# Patient Record
Sex: FEMALE | Race: WHITE | NOT HISPANIC OR LATINO | Employment: FULL TIME | URBAN - METROPOLITAN AREA
[De-identification: names, ages, dates, MRNs, and addresses within clinical notes are randomized per-mention and may not be internally consistent; named-entity substitution may affect disease eponyms.]

---

## 2017-01-06 ENCOUNTER — TELEPHONE (OUTPATIENT)
Dept: SURGERY | Facility: CLINIC | Age: 72
End: 2017-01-06

## 2017-01-09 ENCOUNTER — TELEPHONE (OUTPATIENT)
Dept: SURGERY | Facility: CLINIC | Age: 72
End: 2017-01-09

## 2017-01-09 NOTE — TELEPHONE ENCOUNTER
Patient called to ask how she is to care for an open wound until she see's the MD on Friday. Instructed her to keep it clean and dry. The MD will give further care instructions upon assessment. Pt verbalized understanding.

## 2017-01-13 ENCOUNTER — OFFICE VISIT (OUTPATIENT)
Dept: PLASTIC SURGERY | Facility: CLINIC | Age: 72
End: 2017-01-13
Payer: MEDICARE

## 2017-01-13 DIAGNOSIS — T85.43XA BREAST IMPLANT RUPTURE, INITIAL ENCOUNTER: Primary | ICD-10-CM

## 2017-01-13 PROCEDURE — 1159F MED LIST DOCD IN RCRD: CPT | Mod: S$GLB,,, | Performed by: SURGERY

## 2017-01-13 PROCEDURE — 1157F ADVNC CARE PLAN IN RCRD: CPT | Mod: S$GLB,,, | Performed by: SURGERY

## 2017-01-13 PROCEDURE — 99204 OFFICE O/P NEW MOD 45 MIN: CPT | Mod: S$GLB,,, | Performed by: SURGERY

## 2017-01-13 PROCEDURE — 1160F RVW MEDS BY RX/DR IN RCRD: CPT | Mod: S$GLB,,, | Performed by: SURGERY

## 2017-01-13 NOTE — PROGRESS NOTES
Lilli Stringer presents after having implants placed 25 years ago, she had   silicone implants placed.  She thought she had a skin lesion.  She was seen by a   dermatologist.  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  She has got ruptured   silicone implants.  The silicone is on the right medial aspect of the breast   leaking through the skin.  It is inflamed.  It is not infected.  She needs to   have a bilateral implant removal with total capsulectomy and drainage of   extravasated silicone.  She was interested in further surgery, possibly a breast   lift and implants were placed.  I told her that this is a surgery needs to be   performed first in by itself that would not perform any other procedures at the   same time due to the fact that she is a smoker if she wanted any further lift in   an implant, she would have to do that in two stages.  We will schedule her for   her surgery.      CHRISTOPHER  dd: 01/13/2017 12:55:06 (CST)  td: 01/14/2017 11:58:10 (CST)  Doc ID   #[id number]  Job ID #[id number]    CC:

## 2017-01-13 NOTE — LETTER
January 13, 2017      Drake Mcclure MD  [address] [facility] [address]  [address]  [place] [address]           [facility]  [address] [facility] [address] [place] [address]  Phone: [phone]  Fax: [phone]          Patient: Lilli Stringer   MR Number: [id number]   YOB: 1945   Date of Visit: 1/13/2017       Dear Dr. Drake Mcclure:    Thank you for referring Lilli Stringer to me for evaluation. Attached you will find relevant portions of my assessment and plan of care.    If you have questions, please do not hesitate to call me. I look forward to following Lilli Stringer along with you.    Sincerely,    Kunal Owen MD    Enclosure  CC:  No Recipients    If you would like to receive this communication electronically, please contact [email or web address] or [phone] to request more information on [employer] Link access.    For providers and/or their staff who would like to refer a patient to [facility], please contact us through our one-stop-shop provider referral line, [facility], at [phone].    If you feel you have received this communication in error or would no longer like to receive these types of communications, please e-mail [email or web address]

## 2017-02-24 ENCOUNTER — OFFICE VISIT (OUTPATIENT)
Dept: PLASTIC SURGERY | Facility: CLINIC | Age: 72
End: 2017-02-24
Payer: MEDICARE

## 2017-02-24 DIAGNOSIS — T85.44XD CAPSULAR CONTRACTURE OF BREAST IMPLANT, SUBSEQUENT ENCOUNTER: Primary | ICD-10-CM

## 2017-02-24 PROCEDURE — 99212 OFFICE O/P EST SF 10 MIN: CPT | Mod: S$GLB,,, | Performed by: SURGERY

## 2017-02-24 PROCEDURE — 1157F ADVNC CARE PLAN IN RCRD: CPT | Mod: S$GLB,,, | Performed by: SURGERY

## 2017-02-24 PROCEDURE — 1160F RVW MEDS BY RX/DR IN RCRD: CPT | Mod: S$GLB,,, | Performed by: SURGERY

## 2017-02-24 PROCEDURE — 1159F MED LIST DOCD IN RCRD: CPT | Mod: S$GLB,,, | Performed by: SURGERY

## 2017-02-24 NOTE — PROGRESS NOTES
Ms. Stringer initially presented to the [facility] with an acute   problem on January 13th.  She had bilateral implants placed almost 25 to 30   years ago.  At that time, my evaluation showed that her breast on the right side   was inflamed, that she had a free silicone leaking out of the wound in the   medial aspect of the breast.  On the right side, she has a severe capsular   contracture.  Implants placed 25 to 30 years almost assuredly ruptured at this   time due to the poor manufacturing at that time.  I presented this case to the   insurance company.  She has an absolute without question medical indication to   have her implants and capsules removed.  She has silicone draining from her   breast out onto her skin.  She has a large inflammatory reaction because of   this.  Apparently, the insurance company did not believe that this was a   medically indicated procedure.  This is without question a medically indicated   procedure.  She needs to have her bilateral implants removed, bilateral   capsulectomies and she also needs to have the free silicone which is   extravasated in the right medial breast area also excised.  We will go ahead and   submit this again to the insurance company.      KRYSTYNA  dd: 02/24/2017 10:31:29 (CST)  td: 02/24/2017 20:25:14 (CST)  Doc ID   #[id number]  Job ID #[id number]    CC:

## 2017-06-02 ENCOUNTER — TELEPHONE (OUTPATIENT)
Dept: FAMILY MEDICINE | Facility: CLINIC | Age: 72
End: 2017-06-02

## 2017-06-02 ENCOUNTER — OFFICE VISIT (OUTPATIENT)
Dept: PLASTIC SURGERY | Facility: CLINIC | Age: 72
End: 2017-06-02
Payer: MEDICARE

## 2017-06-02 VITALS — WEIGHT: 114 LBS | BODY MASS INDEX: 18.97 KG/M2

## 2017-06-02 DIAGNOSIS — T85.44XD CAPSULAR CONTRACTURE OF BREAST IMPLANT, SUBSEQUENT ENCOUNTER: Primary | ICD-10-CM

## 2017-06-02 PROCEDURE — 1159F MED LIST DOCD IN RCRD: CPT | Mod: S$GLB,,, | Performed by: SURGERY

## 2017-06-02 PROCEDURE — 99212 OFFICE O/P EST SF 10 MIN: CPT | Mod: S$GLB,,, | Performed by: SURGERY

## 2017-06-02 PROCEDURE — 99999 PR PBB SHADOW E&M-EST. PATIENT-LVL I: CPT | Mod: PBBFAC,,, | Performed by: SURGERY

## 2017-06-02 NOTE — TELEPHONE ENCOUNTER
----- Message from Gui Jackson sent at 6/2/2017 11:02 AM CDT -----  Pt called asking for a call back to schedule a surgery clearance appt that is in a couple of weeks/pls call back at [phone] or [phone]

## 2017-06-02 NOTE — TELEPHONE ENCOUNTER
Preop clearance schedule on 6/12/17.  Surgery with Dr Owen, date not confirmed yet- approx 2 weeks.

## 2017-06-02 NOTE — PROGRESS NOTES
Ms. Stringer presents again.  She has got a ruptured silicone implant on the   right hand side with freely leaking silicone.  We discussed this in January.    She needs to have the implant removed.  The insurance company refused to pay for   the opposite side.  She would like both of them out.  I stated to her and I   stated again today, I will not do any form of reconstruction on her breast after   removing the implant for about six months.  She is a heavy smoker.  The risks   are high.  She just needs to have a bilateral implant removal with bilateral   total capsulectomy.      CRB/PN  dd: 06/02/2017 10:20:23 (CDT)  td: 06/02/2017 15:18:27 (CDT)  Doc ID   #[id number]  Job ID #[id number]    CC:

## 2017-06-05 ENCOUNTER — TELEPHONE (OUTPATIENT)
Dept: FAMILY MEDICINE | Facility: CLINIC | Age: 72
End: 2017-06-05

## 2017-06-05 NOTE — TELEPHONE ENCOUNTER
patient having implant replacement because of leakage. Her appt for clearance was scheduled on 6/12. She said surgery sometime in July. She wanted to know if somebody can see her for this,She is having general anesthesia.Patient put in waiting list until Monday. Please advise,Thanks

## 2017-06-12 ENCOUNTER — PATIENT OUTREACH (OUTPATIENT)
Dept: ADMINISTRATIVE | Facility: HOSPITAL | Age: 72
End: 2017-06-12

## 2017-06-20 ENCOUNTER — PATIENT OUTREACH (OUTPATIENT)
Dept: ADMINISTRATIVE | Facility: HOSPITAL | Age: 72
End: 2017-06-20

## 2017-06-20 NOTE — LETTER
June 20, 2017    Lilli Stringer  [address] [place] [address]             [facility]  [address]  [facility] [address]  Phone: [phone] Dear Ms. Stringer:    We have tried to reach you by mychart unsuccessfully.    [facility] is committed to your overall health.  To help you get the most out of each of your visits, we will review your information to make sure you are up to date on all of your recommended tests and/or procedures.       Dr. Gonzáles         has found that you may be due for:     One-time Hepatitis C Screening lab test(a viral condition that can harm the liver)   Cholesterol check (Lipid Panel)   Tetanus immunization   Osteoporosis screening (DEXA SCAN)   colonoscopy   Shingles immunization   Pneumonia immunization     If you have had any of the above done at another facility, please bring the records or information with you so that your record at [facility] will be complete.      If you are currently taking medication , please bring it with you to your appointment for review.     We appreciate the opportunity to provide you with excellent medical care.     Sincerely,    May Garcia  Clinical Care Coordinator  [facility] [address] [facility] [address]  [place] [address]  Phone: [phone]   Fax: [phone]

## 2017-06-26 ENCOUNTER — OFFICE VISIT (OUTPATIENT)
Dept: FAMILY MEDICINE | Facility: CLINIC | Age: 72
End: 2017-06-26
Payer: MEDICARE

## 2017-06-26 VITALS
DIASTOLIC BLOOD PRESSURE: 70 MMHG | TEMPERATURE: 98 F | SYSTOLIC BLOOD PRESSURE: 126 MMHG | HEART RATE: 76 BPM | WEIGHT: 114 LBS | BODY MASS INDEX: 18.99 KG/M2 | HEIGHT: 65 IN | RESPIRATION RATE: 18 BRPM

## 2017-06-26 DIAGNOSIS — Z01.818 PREOP EXAMINATION: Primary | ICD-10-CM

## 2017-06-26 DIAGNOSIS — Z00.00 PREVENTATIVE HEALTH CARE: ICD-10-CM

## 2017-06-26 DIAGNOSIS — T85.43XD BREAST IMPLANT RUPTURE, SUBSEQUENT ENCOUNTER: ICD-10-CM

## 2017-06-26 PROCEDURE — 1126F AMNT PAIN NOTED NONE PRSNT: CPT | Mod: S$GLB,,, | Performed by: FAMILY MEDICINE

## 2017-06-26 PROCEDURE — 99999 PR PBB SHADOW E&M-EST. PATIENT-LVL III: CPT | Mod: PBBFAC,,, | Performed by: FAMILY MEDICINE

## 2017-06-26 PROCEDURE — 1159F MED LIST DOCD IN RCRD: CPT | Mod: S$GLB,,, | Performed by: FAMILY MEDICINE

## 2017-06-26 PROCEDURE — 99397 PER PM REEVAL EST PAT 65+ YR: CPT | Mod: S$GLB,,, | Performed by: FAMILY MEDICINE

## 2017-06-26 NOTE — PROGRESS NOTES
Subjective:       Patient ID: Lilli Stringer is a 71 y.o. female.    Chief Complaint: Pre-op Exam (Breast implant removals, Dr. Owen)    She will be having bilateral implants removed with Dr. Owen.    She is overall feeling well.        Past Medical History:   Diagnosis Date    Allergy     Endometriosis of uterus        Past Surgical History:   Procedure Laterality Date    BREAST SURGERY  1980s    DILATION AND CURETTAGE OF UTERUS      TUBAL LIGATION         Review of patient's allergies indicates:   Allergen Reactions    Iodine and iodide containing products     Sulfa (sulfonamide antibiotics)        Social History     Social History    Marital status:      Spouse name: N/A    Number of children: 1    Years of education: N/A     Occupational History    [employer]      Social History Main Topics    Smoking status: Current Some Day Smoker     Packs/day: 0.25     Types: Cigarettes    Smokeless tobacco: Never Used    Alcohol use Yes      Comment: social    Drug use: No    Sexual activity: Yes     Partners: Male     Birth control/ protection: None, Surgical     Other Topics Concern    Not on file     Social History Narrative    No narrative on file       Current Outpatient Prescriptions on File Prior to Visit   Medication Sig Dispense Refill    fluticasone (FLONASE) 50 mcg/actuation nasal spray APPLY TWO SPRAYS NASALLY ONCE TO TWICE DAILY DEPENDING ON SEVERITY OFSYMPTOMS 1 Bottle 0     No current facility-administered medications on file prior to visit.        Family History   Problem Relation Age of Onset    Alzheimer's disease Mother     Cancer Mother     Breast cancer Neg Hx     Ovarian cancer Neg Hx        Review of Systems   Constitutional: Negative for appetite change, chills, fever and unexpected weight change.   HENT: Negative for sore throat and trouble swallowing.    Eyes: Negative for pain and visual disturbance.   Respiratory: Negative for cough, shortness of  "breath and wheezing.    Cardiovascular: Negative for chest pain and palpitations.   Gastrointestinal: Negative for abdominal pain, blood in stool, diarrhea, nausea and vomiting.   Genitourinary: Negative for difficulty urinating, dysuria and hematuria.   Musculoskeletal: Negative for arthralgias, gait problem and neck pain.   Skin: Negative for rash and wound.   Neurological: Negative for dizziness, weakness, numbness and headaches.   Hematological: Negative for adenopathy.   Psychiatric/Behavioral: Negative for dysphoric mood.       Objective:      /70 (BP Location: Left arm, Patient Position: Sitting, BP Method: Manual)   Pulse 76   Temp 98.1 °F (36.7 °C) (Oral)   Resp 18   Ht 5' 5" (1.651 m)   Wt 51.7 kg (113 lb 15.7 oz)   BMI 18.97 kg/m²   Physical Exam   Constitutional: She is oriented to person, place, and time. She appears well-developed and well-nourished.   HENT:   Head: Normocephalic.   Mouth/Throat: Oropharynx is clear and moist. No oropharyngeal exudate or posterior oropharyngeal erythema.   Eyes: Conjunctivae and EOM are normal. Pupils are equal, round, and reactive to light.   Neck: Normal range of motion. Neck supple. No thyromegaly present.   Cardiovascular: Normal rate, regular rhythm, S1 normal, S2 normal, normal heart sounds and intact distal pulses.  Exam reveals no gallop and no friction rub.    No murmur heard.  Pulmonary/Chest: Effort normal and breath sounds normal. She has no wheezes. She has no rales.   Abdominal: Normal appearance.   Musculoskeletal:        Right lower leg: She exhibits no edema.        Left lower leg: She exhibits no edema.   Lymphadenopathy:     She has no cervical adenopathy.   Neurological: She is alert and oriented to person, place, and time. No cranial nerve deficit. Gait normal.   Skin: Skin is warm and intact. No rash noted.   Psychiatric: She has a normal mood and affect.       Labs reviewed    Assessment:       1. Preop examination    2. Preventative " health care    3. Breast implant rupture, subsequent encounter        Plan:       Preop examination  -     Comprehensive metabolic panel; Future; Expected date: 06/26/2017  -     CBC auto differential; Future; Expected date: 06/26/2017  -     X-Ray Chest PA And Lateral; Future; Expected date: 06/26/2017    Preventative health care  -     Hepatitis C antibody; Future; Expected date: 06/26/2017    Breast implant rupture, subsequent encounter        Medically cleared to proceed with breast surgery  Counseled on regular exercise, maintenance of a healthy weight, balanced diet rich in fruits/vegetables and lean protein, and avoidance of unhealthy habits like smoking and excessive alcohol intake.

## 2017-06-28 ENCOUNTER — HOSPITAL ENCOUNTER (OUTPATIENT)
Dept: RADIOLOGY | Facility: HOSPITAL | Age: 72
Discharge: HOME | End: 2017-06-28
Attending: FAMILY MEDICINE
Payer: MEDICARE

## 2017-06-28 DIAGNOSIS — Z01.818 PREOP EXAMINATION: ICD-10-CM

## 2017-06-28 PROCEDURE — 71020 XR CHEST PA AND LATERAL: CPT | Mod: 26,,, | Performed by: RADIOLOGY

## 2017-06-28 PROCEDURE — 71020 XR CHEST PA AND LATERAL: CPT | Mod: TC,PO

## 2017-07-07 ENCOUNTER — OFFICE VISIT (OUTPATIENT)
Dept: PLASTIC SURGERY | Facility: CLINIC | Age: 72
End: 2017-07-07
Payer: MEDICARE

## 2017-07-07 ENCOUNTER — TELEPHONE (OUTPATIENT)
Dept: FAMILY MEDICINE | Facility: CLINIC | Age: 72
End: 2017-07-07

## 2017-07-07 VITALS — HEIGHT: 65 IN | BODY MASS INDEX: 19.11 KG/M2 | WEIGHT: 114.69 LBS

## 2017-07-07 DIAGNOSIS — T85.44XD CAPSULAR CONTRACTURE OF BREAST IMPLANT, SUBSEQUENT ENCOUNTER: Primary | ICD-10-CM

## 2017-07-07 PROCEDURE — 99212 OFFICE O/P EST SF 10 MIN: CPT | Mod: S$GLB,,, | Performed by: SURGERY

## 2017-07-07 PROCEDURE — 99999 PR PBB SHADOW E&M-EST. PATIENT-LVL II: CPT | Mod: PBBFAC,,, | Performed by: SURGERY

## 2017-07-07 PROCEDURE — 1126F AMNT PAIN NOTED NONE PRSNT: CPT | Mod: S$GLB,,, | Performed by: SURGERY

## 2017-07-07 PROCEDURE — 1159F MED LIST DOCD IN RCRD: CPT | Mod: S$GLB,,, | Performed by: SURGERY

## 2017-07-07 NOTE — TELEPHONE ENCOUNTER
----- Message from Sandro Ortega sent at 7/7/2017 12:02 PM CDT -----  Contact: pt  Test Results  Call Back#[phone] or   Thanks

## 2017-07-07 NOTE — PROGRESS NOTES
Pt to have bilateral implant removal, bilateral capsulectomy, and removal of free silicone.  She understands She will definitely have a breast deformity after this operation.  Also a risk is nipple loss. Pt is a heavy smoker.  Will schedule

## 2017-07-31 ENCOUNTER — TELEPHONE (OUTPATIENT)
Dept: FAMILY MEDICINE | Facility: CLINIC | Age: 72
End: 2017-07-31

## 2017-07-31 DIAGNOSIS — T85.43XD BREAST IMPLANT RUPTURE, SUBSEQUENT ENCOUNTER: Primary | ICD-10-CM

## 2017-07-31 NOTE — TELEPHONE ENCOUNTER
----- Message from Trinh Shelton sent at 7/31/2017 10:05 AM CDT -----  Contact: Patient  Patient states that she needs a referral to have a procedure done and would like to discuss it with you.  Can you please call the patient back at on her cell phone [phone].  Thank you

## 2017-08-02 NOTE — TELEPHONE ENCOUNTER
----- Message from Trinh Komallivan sent at 8/2/2017  8:13 AM CDT -----  Contact: Patient  Patient states that she missed the call and she is available today.  You can call her back on her cell phone.  The number is [phone].  Thank you

## 2017-08-02 NOTE — TELEPHONE ENCOUNTER
Please address in Dr Gonzáles's absence.    Need retro referral for plastic surgeon evaluation on 7/28/17, Dr Bucky Stone, [phone], [address] [place], [address], fax [phone], att: Lakeisha or Leonor, leaking bilateral breast implants.  Originally was being done by [facility], patient now wants new surgeon.    Pended for review.

## 2017-08-17 ENCOUNTER — TELEPHONE (OUTPATIENT)
Dept: FAMILY MEDICINE | Facility: CLINIC | Age: 72
End: 2017-08-17

## 2017-08-17 NOTE — TELEPHONE ENCOUNTER
----- Message from Karen Ziegler sent at 8/17/2017  2:54 PM CDT -----  Contact: [phone]  Patient is returning nurse's phone call.  Please call patient back at [phone].

## 2017-08-17 NOTE — TELEPHONE ENCOUNTER
----- Message from Corine Lee sent at 8/17/2017  1:47 PM CDT -----  Contact: Portia  Patient is calling regarding referral to Dr Stone along with lab work as went to appointment but information was not there. Please call [phone]. Thanks!

## 2017-08-17 NOTE — TELEPHONE ENCOUNTER
Spoke to pt. Pt requesting us to send referral and lab results to Dr. Stone. Advised pt I would fax it now. Pt verbalized understanding.

## 2017-09-22 DIAGNOSIS — Z12.11 COLON CANCER SCREENING: ICD-10-CM

## 2018-06-20 ENCOUNTER — TELEPHONE (OUTPATIENT)
Dept: FAMILY MEDICINE | Facility: CLINIC | Age: 73
End: 2018-06-20

## 2018-10-02 DIAGNOSIS — Z12.11 COLON CANCER SCREENING: ICD-10-CM

## 2018-12-13 ENCOUNTER — TELEPHONE (OUTPATIENT)
Dept: FAMILY MEDICINE | Facility: CLINIC | Age: 73
End: 2018-12-13

## 2018-12-13 NOTE — TELEPHONE ENCOUNTER
Offered to schedule annual [facility] wellness visit~ pt declined to schedule because she did not have her schedule available. Pt elected to call to schedule at a later time. Ms. Stringer was given my name and direct office number to schedule her appt.

## 2019-06-11 ENCOUNTER — PES CALL (OUTPATIENT)
Dept: ADMINISTRATIVE | Facility: CLINIC | Age: 74
End: 2019-06-11

## 2019-08-23 ENCOUNTER — TELEPHONE (OUTPATIENT)
Dept: FAMILY MEDICINE | Facility: CLINIC | Age: 74
End: 2019-08-23

## 2019-10-08 ENCOUNTER — PES CALL (OUTPATIENT)
Dept: ADMINISTRATIVE | Facility: CLINIC | Age: 74
End: 2019-10-08

## 2020-08-11 ENCOUNTER — TELEPHONE (OUTPATIENT)
Dept: URGENT CARE | Facility: CLINIC | Age: 75
End: 2020-08-11

## 2020-08-11 ENCOUNTER — OFFICE VISIT (OUTPATIENT)
Dept: URGENT CARE | Facility: CLINIC | Age: 75
End: 2020-08-11
Payer: COMMERCIAL

## 2020-08-11 VITALS
RESPIRATION RATE: 18 BRPM | BODY MASS INDEX: 19.1 KG/M2 | TEMPERATURE: 97 F | HEIGHT: 65 IN | OXYGEN SATURATION: 97 % | SYSTOLIC BLOOD PRESSURE: 134 MMHG | HEART RATE: 85 BPM | DIASTOLIC BLOOD PRESSURE: 80 MMHG | WEIGHT: 114.63 LBS

## 2020-08-11 DIAGNOSIS — M54.50 ACUTE LEFT-SIDED LOW BACK PAIN WITHOUT SCIATICA: Primary | ICD-10-CM

## 2020-08-11 PROCEDURE — 99214 OFFICE O/P EST MOD 30 MIN: CPT | Mod: 25,S$GLB,, | Performed by: PHYSICIAN ASSISTANT

## 2020-08-11 PROCEDURE — 96372 PR INJECTION,THERAP/PROPH/DIAG2ST, IM OR SUBCUT: ICD-10-PCS | Mod: S$GLB,,, | Performed by: FAMILY MEDICINE

## 2020-08-11 PROCEDURE — 96372 THER/PROPH/DIAG INJ SC/IM: CPT | Mod: S$GLB,,, | Performed by: FAMILY MEDICINE

## 2020-08-11 PROCEDURE — 99214 PR OFFICE/OUTPT VISIT, EST, LEVL IV, 30-39 MIN: ICD-10-PCS | Mod: 25,S$GLB,, | Performed by: PHYSICIAN ASSISTANT

## 2020-08-11 RX ORDER — KETOROLAC TROMETHAMINE 30 MG/ML
30 INJECTION, SOLUTION INTRAMUSCULAR; INTRAVENOUS
Status: COMPLETED | OUTPATIENT
Start: 2020-08-11 | End: 2020-08-11

## 2020-08-11 RX ORDER — METHOCARBAMOL 750 MG/1
750 TABLET, FILM COATED ORAL 3 TIMES DAILY
Qty: 30 TABLET | Refills: 0 | Status: SHIPPED | OUTPATIENT
Start: 2020-08-11 | End: 2020-08-20 | Stop reason: SDUPTHER

## 2020-08-11 RX ADMIN — KETOROLAC TROMETHAMINE 30 MG: 30 INJECTION, SOLUTION INTRAMUSCULAR; INTRAVENOUS at 11:08

## 2020-08-11 NOTE — PROGRESS NOTES
"Subjective:       Patient ID: Lilli Stringer is a 74 y.o. female.    Vitals:  height is 5' 5" (1.651 m) and weight is 52 kg (114 lb 10.2 oz). Her temperature is 97.3 °F (36.3 °C). Her blood pressure is 134/80 and her pulse is 85. Her respiration is 18 and oxygen saturation is 97%.     Chief Complaint: Back Pain    Pt presents to the clinic today for left lower back pain, x last week. Pt has used icy hot to treat the pain.     Back Pain  This is a new problem. The problem is unchanged. The pain is present in the sacro-iliac. The pain does not radiate. The pain is at a severity of 7/10. The pain is moderate. The pain is the same all the time. The symptoms are aggravated by position, standing and sitting. Pertinent negatives include no chest pain, dysuria, fever or headaches. She has tried analgesics for the symptoms. The treatment provided no relief.       Constitution: Negative for chills and fever.   Neck: Negative for neck pain and neck stiffness.   Cardiovascular: Negative for chest pain and palpitations.   Respiratory: Negative for cough and shortness of breath.    Gastrointestinal: Negative for nausea, vomiting, constipation and diarrhea.   Genitourinary: Negative for dysuria, frequency and urgency.   Musculoskeletal: Positive for back pain. Negative for trauma.   Skin: Negative for rash, erythema and bruising.   Neurological: Negative for dizziness and headaches.       Objective:      Physical Exam   Constitutional: She is oriented to person, place, and time. She appears well-developed. She does not appear ill. No distress.   HENT:   Head: Normocephalic and atraumatic.   Ears:   Right Ear: External ear normal.   Left Ear: External ear normal.   Mouth/Throat: Oropharynx is clear and moist.   Eyes: Conjunctivae, EOM and lids are normal. Right eye exhibits no discharge. Left eye exhibits no discharge. extraocular movement intact  Neck: Trachea normal, full passive range of motion without pain and " phonation normal. Neck supple.   Pulmonary/Chest: Effort normal. No respiratory distress.   Abdominal: Normal appearance.   Musculoskeletal: Normal range of motion.        Arms:    Neurological: She is alert and oriented to person, place, and time.   Skin: Skin is warm, dry, intact and no rash. erythemaPsychiatric: Her speech is normal and behavior is normal. Mood, judgment and thought content normal.   Nursing note and vitals reviewed.        Assessment:       1. Acute left-sided low back pain without sciatica        Plan:         Acute left-sided low back pain without sciatica    Other orders  -     ketorolac injection 30 mg  -     methocarbamoL (ROBAXIN) 750 MG Tab; Take 1 tablet (750 mg total) by mouth 3 (three) times daily. for 10 days  Dispense: 30 tablet; Refill: 0     Advised NSAIDs regularly and rest.  She will call me Thursday to check in and let me she is feeling.    Patient Instructions     General Neck and Back Pain    Both neck and back pain are usually caused by injury to the muscles or ligaments of the spine. Sometimes the disks that separate each bone of the spine may cause pain by pressing on a nearby nerve. Back and neck pain may appear after a sudden twisting or bending force (such as in a car accident), or sometimes after a simple awkward movement. In either case, muscle spasm is often present and adds to the pain.  Acute neck and back pain usually gets better in 1 to 2 weeks. Pain related to disk disease, arthritis in the spinal joints or spinal stenosis (narrowing of the spinal canal) can become chronic and last for months or years.  Back and neck pain are common problems. Most people feel better in 1 or 2 weeks, and most of the rest in 1 to 2 months. Most people can remain active.  People experience and describe pain differently.  · Pain can be sharp, stabbing, shooting, aching, cramping, or burning  · Movement, standing, bending, lifting, sitting, or walking may worsen the pain  · Pain can  be localized to one spot or area, or it can be more generalized  · Pain can spread or radiate upwards, downwards, to the front, or go down your arms  · Muscle spasm may occur.  Most of the time mechanical problems with the muscles or spine cause the pain. it is usually caused by an injury, whether known or not, to the muscles or ligaments. While illnesses can cause back pain, it is usually not caused by a serious illness. Pain is usually related to physical activity, whether sports, exercise, work, or normal activity. Sometimes it can occur without an identifiable cause. This can happen simply by stretching or moving wrong, without noting pain at the time. Other causes include:  · Overexertion, lifting, pushing, pulling incorrectly or too aggressively.  · Sudden twisting, bending or stretching from an accident (car or fall), or accidental movement.  · Poor posture  · Poor conditioning, lack of regular exercise  · Spinal disc disease or arthritis  · Stress  · Pregnancy, or illness like appendicitis, bladder or kidney infection, pelvic infections   Home care  · For neck pain: Use a comfortable pillow that supports the head and keeps the spine in a neutral position. The position of the head should not be tilted forward or backward.  · When in bed, try to find a position of comfort. A firm mattress is best. Try lying flat on your back with pillows under your knees. You can also try lying on your side with your knees bent up towards your chest and a pillow between your knees.  · At first, do not try to stretch out the sore spots. If there is a strain, it is not like the good soreness you get after exercising without an injury. In this case, stretching may make it worse.  · Avoid prolonged sitting, long car rides or travel. This puts more stress on the lower back than standing or walking.  · During the first 24 to 72 hours after an injury, apply an ice pack to the painful area for 20 minutes and then remove it for 20  minutes over a period of 60 to 90 minutes or several times a day.   · You can alternate ice and heat therapies. Talk with your healthcare provider about the best treatment for your back or neck pain. As a safety precaution, do not use a heating pad at bedtime. Sleeping with a heating pad can lead to skin burns or tissue damage.  · Therapeutic massage can help relax the back and neck muscles without stretching them.  · Be aware of safe lifting methods and do not lift anything over 15 pounds until all the pain is gone.  Medications  Talk to your healthcare provider before using medicine, especially if you have other medical problems or are taking other medicines.  · You may use over-the-counter medicine to control pain, unless another pain medicine was prescribed. If you have chronic conditions like diabetes, liver or kidney disease, stomach ulcers,  gastrointestinal bleeding, or are taking blood thinner medicines.  · Be careful if you are given pain medicines, narcotics, or medicine for muscle spasm. They can cause drowsiness, and can affect your coordination, reflexes, and judgment. Do not drive or operate heavy machinery.  Follow-up care  Follow up with your healthcare provider, or as advised. Physical therapy or further tests may be needed.  If X-rays were taken, you will be notified of any new findings that may affect your care.  Call 911  Seek emergency medical care if any of the following occur:  · Trouble breathing  · Confusion  · Very drowsy or trouble awakening  · Fainting or loss of consciousness  · Rapid or very slow heart rate  · Loss of bowel or bladder control  When to seek medical advice  Call your healthcare provider right away if any of these occur:  · Pain becomes worse or spreads into your arms or legs  · Weakness, numbness or pain in one or both arms or legs  · Numbness in the groin area  · Difficulty walking  · Fever of 100.4ºF (38ºC) or higher, or as directed by your healthcare provider  Date  Last Reviewed: 7/1/2016  © 4416-0538 [employer], [employer]. [address] [place] [address], [place] [address]. All rights reserved. This information is not intended as a substitute for professional medical care. Always follow your healthcare professional's instructions.

## 2020-08-13 ENCOUNTER — OFFICE VISIT (OUTPATIENT)
Dept: URGENT CARE | Facility: CLINIC | Age: 75
End: 2020-08-13
Payer: COMMERCIAL

## 2020-08-13 VITALS
WEIGHT: 115 LBS | BODY MASS INDEX: 19.16 KG/M2 | HEART RATE: 78 BPM | TEMPERATURE: 97 F | RESPIRATION RATE: 16 BRPM | DIASTOLIC BLOOD PRESSURE: 76 MMHG | SYSTOLIC BLOOD PRESSURE: 141 MMHG | HEIGHT: 65 IN | OXYGEN SATURATION: 97 %

## 2020-08-13 DIAGNOSIS — M54.50 ACUTE LEFT-SIDED LOW BACK PAIN WITHOUT SCIATICA: Primary | ICD-10-CM

## 2020-08-13 DIAGNOSIS — M54.9 DORSALGIA, UNSPECIFIED: ICD-10-CM

## 2020-08-13 PROCEDURE — 99213 OFFICE O/P EST LOW 20 MIN: CPT | Mod: S$GLB,,, | Performed by: PHYSICIAN ASSISTANT

## 2020-08-13 PROCEDURE — 99213 PR OFFICE/OUTPT VISIT, EST, LEVL III, 20-29 MIN: ICD-10-PCS | Mod: S$GLB,,, | Performed by: PHYSICIAN ASSISTANT

## 2020-08-13 PROCEDURE — 72100 X-RAY EXAM L-S SPINE 2/3 VWS: CPT | Mod: S$GLB,,, | Performed by: RADIOLOGY

## 2020-08-13 PROCEDURE — 72100 XR LUMBAR SPINE 2 OR 3 VIEWS: ICD-10-PCS | Mod: S$GLB,,, | Performed by: RADIOLOGY

## 2020-08-13 RX ORDER — PREDNISONE 10 MG/1
TABLET ORAL
Qty: 20 TABLET | Refills: 0 | Status: SHIPPED | OUTPATIENT
Start: 2020-08-13 | End: 2021-11-16 | Stop reason: ALTCHOICE

## 2020-08-13 NOTE — LETTER
August 13, 2020      [facility] [place] [address], [address]  [facility] [address]  Phone: [phone]  Fax: [phone]       Patient: Lilil Stringer   YOB: 1945  Date of Visit: 08/13/2020    To Whom It May Concern:    Alessio Stringer  was at [facility] on 08/13/2020. She may return to work/school after follow up with specialist. If you have any questions or concerns, or if I can be of further assistance, please do not hesitate to contact me.    Sincerely,    DARIO Potter

## 2020-08-13 NOTE — PATIENT INSTRUCTIONS

## 2020-08-13 NOTE — PROGRESS NOTES
"Subjective:       Patient ID: Lilli Stringer is a 74 y.o. female.    Vitals:  height is 5' 5" (1.651 m) and weight is 52.2 kg (115 lb). Her temperature is 97.2 °F (36.2 °C). Her blood pressure is 141/76 (abnormal) and her pulse is 78. Her respiration is 16 and oxygen saturation is 97%.     Chief Complaint: Back Pain    Patient reports lower back pain, radiates down left leg.    Back Pain  This is a new problem. The current episode started in the past 7 days. The problem occurs constantly. The problem has been waxing and waning since onset. The pain is present in the lumbar spine. The quality of the pain is described as shooting and stabbing. The pain radiates to the left thigh. The pain is at a severity of 9/10. The pain is severe. The symptoms are aggravated by bending, standing and sitting. Pertinent negatives include no bladder incontinence, chest pain, dysuria, fever, headaches, numbness, pelvic pain or tingling. She has tried NSAIDs and muscle relaxant for the symptoms.       Constitution: Negative for chills, fatigue and fever.   HENT: Negative for congestion and sore throat.    Neck: Negative for painful lymph nodes.   Cardiovascular: Negative for chest pain and leg swelling.   Eyes: Negative for double vision and blurred vision.   Respiratory: Negative for cough and shortness of breath.    Gastrointestinal: Negative for nausea, vomiting and diarrhea.   Genitourinary: Negative for dysuria, frequency, urgency, bladder incontinence, history of kidney stones and pelvic pain.   Musculoskeletal: Positive for pain, back pain and muscle ache. Negative for joint pain, joint swelling and muscle cramps.   Skin: Negative for color change, pale, rash, erythema and bruising.   Allergic/Immunologic: Negative for seasonal allergies.   Neurological: Negative for dizziness, history of vertigo, light-headedness, passing out, headaches and numbness.   Hematologic/Lymphatic: Negative for swollen lymph nodes. "   Psychiatric/Behavioral: Negative for nervous/anxious, sleep disturbance and depression. The patient is not nervous/anxious.        Objective:      Physical Exam   Constitutional: She does not appear ill. No distress.   HENT:   Head: Normocephalic and atraumatic.   Ears:   Right Ear: External ear normal.   Left Ear: External ear normal.   Eyes: Conjunctivae are normal. Right eye exhibits no discharge. Left eye exhibits no discharge. extraocular movement intact  Abdominal: Normal appearance.   Musculoskeletal:      Comments: Tender to palpation left lower back.  Negative straight leg raise.   Neurological: She is alert. No sensory deficit.   Skin: Skin is warm, dry and no rash. erythemaPsychiatric: Her behavior is normal. Mood, judgment and thought content normal.   Nursing note and vitals reviewed.        Assessment:       1. Acute left-sided low back pain without sciatica    2. Dorsalgia, unspecified        Plan:         Acute left-sided low back pain without sciatica  -     Ambulatory referral/consult to [facility]    Dorsalgia, unspecified  -     XR LUMBAR SPINE 2 OR 3 VIEWS; Future; Expected date: 08/13/2020    Xr Lumbar Spine 2 Or 3 Views    Result Date: 8/13/2020  EXAMINATION: XR LUMBAR SPINE 2 OR 3 VIEWS CLINICAL HISTORY: Back pain, > 4 wks or red flag, initial exam (Ped 0-18y);  Dorsalgia, unspecified FINDINGS: Alignment is normal.  There is mild DJD.  No fracture dislocation bone destruction seen.  No trauma seen.     No acute process seen. Electronically signed by: Devin Early MD Date:    08/13/2020 Time:    12:14    Other orders  -     predniSONE (DELTASONE) 10 MG tablet; Take 40mg for 2days, take 30mg for 2 days, take 20mg for 2 days, take 10mg for 2 days  Dispense: 20 tablet; Refill: 0     Discussed risks versus benefits steroids and patient would like to proceed.  Will place referral for back and spine clinic.    Patient Instructions   You must understand that you've received an Urgent  Care treatment only and that you may be released before all your medical problems are known or treated. You, the patient, will arrange for follow up care as instructed.  Follow up with your PCP or specialty clinic as directed in the next 1-2 weeks if not improved or as needed.  You can call [phone] to schedule an appointment with the appropriate provider.  If your condition worsens we recommend that you receive another evaluation at the emergency room immediately or contact your primary medical clinics after hours call service to discuss your concerns.  Please return here or go to the Emergency Department for any concerns or worsening of condition.

## 2020-08-20 ENCOUNTER — OFFICE VISIT (OUTPATIENT)
Dept: SPINE | Facility: CLINIC | Age: 75
End: 2020-08-20
Payer: MEDICARE

## 2020-08-20 VITALS
HEART RATE: 73 BPM | HEIGHT: 65 IN | BODY MASS INDEX: 19.17 KG/M2 | SYSTOLIC BLOOD PRESSURE: 145 MMHG | DIASTOLIC BLOOD PRESSURE: 78 MMHG | WEIGHT: 115.06 LBS

## 2020-08-20 DIAGNOSIS — M54.50 ACUTE LEFT-SIDED LOW BACK PAIN WITHOUT SCIATICA: Primary | ICD-10-CM

## 2020-08-20 PROCEDURE — 3008F PR BODY MASS INDEX (BMI) DOCUMENTED: ICD-10-PCS | Mod: CPTII,S$GLB,, | Performed by: PHYSICIAN ASSISTANT

## 2020-08-20 PROCEDURE — 99999 PR PBB SHADOW E&M-EST. PATIENT-LVL III: CPT | Mod: PBBFAC,,, | Performed by: PHYSICIAN ASSISTANT

## 2020-08-20 PROCEDURE — 1125F PR PAIN SEVERITY QUANTIFIED, PAIN PRESENT: ICD-10-PCS | Mod: S$GLB,,, | Performed by: PHYSICIAN ASSISTANT

## 2020-08-20 PROCEDURE — 1101F PT FALLS ASSESS-DOCD LE1/YR: CPT | Mod: CPTII,S$GLB,, | Performed by: PHYSICIAN ASSISTANT

## 2020-08-20 PROCEDURE — 1101F PR PT FALLS ASSESS DOC 0-1 FALLS W/OUT INJ PAST YR: ICD-10-PCS | Mod: CPTII,S$GLB,, | Performed by: PHYSICIAN ASSISTANT

## 2020-08-20 PROCEDURE — 99203 PR OFFICE/OUTPT VISIT, NEW, LEVL III, 30-44 MIN: ICD-10-PCS | Mod: S$GLB,,, | Performed by: PHYSICIAN ASSISTANT

## 2020-08-20 PROCEDURE — 1159F PR MEDICATION LIST DOCUMENTED IN MEDICAL RECORD: ICD-10-PCS | Mod: S$GLB,,, | Performed by: PHYSICIAN ASSISTANT

## 2020-08-20 PROCEDURE — 1159F MED LIST DOCD IN RCRD: CPT | Mod: S$GLB,,, | Performed by: PHYSICIAN ASSISTANT

## 2020-08-20 PROCEDURE — 1125F AMNT PAIN NOTED PAIN PRSNT: CPT | Mod: S$GLB,,, | Performed by: PHYSICIAN ASSISTANT

## 2020-08-20 PROCEDURE — 3008F BODY MASS INDEX DOCD: CPT | Mod: CPTII,S$GLB,, | Performed by: PHYSICIAN ASSISTANT

## 2020-08-20 PROCEDURE — 99999 PR PBB SHADOW E&M-EST. PATIENT-LVL III: ICD-10-PCS | Mod: PBBFAC,,, | Performed by: PHYSICIAN ASSISTANT

## 2020-08-20 PROCEDURE — 99203 OFFICE O/P NEW LOW 30 MIN: CPT | Mod: S$GLB,,, | Performed by: PHYSICIAN ASSISTANT

## 2020-08-20 RX ORDER — METHOCARBAMOL 750 MG/1
750 TABLET, FILM COATED ORAL 2 TIMES DAILY PRN
Qty: 30 TABLET | Refills: 0 | Status: SHIPPED | OUTPATIENT
Start: 2020-08-20 | End: 2020-10-14 | Stop reason: SDUPTHER

## 2020-08-20 NOTE — PROGRESS NOTES
Hello! We are asking you to complete following questionnaire prior to your upcoming virtual visit with Alysha Vang. This questionnaire has been designed to give us information on how your low back or leg pain has affected your ability to manage everyday life. Please respond with only ONE number answer to each question which best applies to you TODAY. This will need to be completed and sent back prior to checking in for your appointment.    EXAMPLE OF RESPONSE:  Q1: 2       Q2: 1      Q3: 4     Q1: Pain Intensity 2  0 - No pain  1 - Very Mild pain   2 - Moderate Pain  3 - Fairly Severe Pain  4 - Very Severe Pain  5 - Worst Imaginable Pain    Q2: Personal Care (washing, dressing, etc) 0  0 - I can look after myself normally without causing extra pain  1 - I can look after myself normally but it causes extra pain  2 - It is painful to look after myself and I am slow and careful  3 - I need some help but manage most of my personal care   4 - I need help everyday in most aspects of self-care   5 - I do not get dressed, I wash with difficulty and stay in bed    Q3: Lifting 4  0 - I can lift heavy weights without extra pain  1 - I can lift heavy weights but it gives extra pain   2 - Pain prevents me from lifting heavy weights off the floor, but I can manage if they are conveniently placed (eg. on a table)  3 - Pain prevents me from lifting heavy weights, but I can manage light to medium weights if they are conveniently positioned   4 - I can lift very light weights   5 - I cannot lift or carry anything at all     Q4: Walking 1  0 - Pain does not prevent me walking any distance   1 - Pain prevents me from walking more than 1 mile  2 - Pain prevents me from walking more than 1/2 mile  3 - Pain prevents me from walking more than 100 yards  4 - I can only walk using a stick or crutches  5 - I am in bed most of the time    Q5: Sitting 1  0 - I can sit in any chair as long as I like   1 - I can only sit in my favorite chair  as long as I like   2 - Pain prevents me from sitting for more than one hour   3 - Pain prevents me from sitting for more than 30 minutes   4 - Pain prevents me from sitting for more than 10 minutes   5 - Pain prevents me from sitting at all     Q6: Standing 1  0 - I can stand as long as I want without extra pain  1 - I can stand as long as I want but it gives me extra pain  2 - Pain prevents me from standing for more than 1 hour   3 - Pain prevents me from standing for more than 30 minutes   4 - Pain prevents me from standing for more than 10 minutes   5 - Pain prevents me from standing at all     Q7: Sleeping 1  0 - My sleep is never disturbed by pain   1 - My sleep is occasionally disturbed by pain   2 - Because of pain, I have less than 6 hours of sleep  3 - Because of pain, I have less than 4 hours of sleep  4 - Because of pain, I have less than 2 hours of sleep  5 - Pain prevents me from sleeping at all    Q8: Sex Life (if applicable) 1  0 - My sex life is normal and causes no extra pain  1 - My sex life is normal but causes some extra pain   2 - My sex life is nearly normal but is very painful   3 - My sex life is severely restricted by pain  4 - My sex life is nearly absent because of pain  5 - Pain prevents any sex life at all    Q9: Social Life 3  0 - My social life is normal and gives me no extra pain  1 - My social life is normal but increases the degree of pain  2 - Pain has no significant effect on my social life apart from limiting my more energetic interests such as sports   3 - Pain has restricted my social life and I do not go out as often   4 - Pain has restricted my social life to my house  5 - I have no social life because of pain    Q10: Traveling 4  0 - I can travel anywhere without pain  1 - I can travel anywhere but it gives me extra pain   2 - Pain is bad but I manage journeys over 2 hours   3 - Pain restricts me to journeys of less than 1 hour  4 - Pain restricts me to short necessary  journeys under 30 minutes   5 - Pain prevents me from traveling except to receive treatment

## 2020-08-20 NOTE — PROGRESS NOTES
Back and Spine Consult    Patient ID: Lilli Stringer is a 74 y.o. female.    Chief Complaint   Patient presents with    Low-back Pain     She has left-sided low back pain that started 8-4-20 after moving things around in the [employer] where she works.She was seen in [facility] 8-11-20 and 8-13-20 for this pain.       Review of Systems   Constitutional: Negative for activity change, appetite change, chills, fever and unexpected weight change.   HENT: Negative for tinnitus, trouble swallowing and voice change.    Respiratory: Negative for apnea, cough, chest tightness and shortness of breath.    Cardiovascular: Negative for chest pain and palpitations.   Gastrointestinal: Negative for constipation, diarrhea, nausea and vomiting.   Genitourinary: Negative for difficulty urinating, dysuria, frequency and urgency.   Musculoskeletal: Positive for back pain. Negative for gait problem, neck pain and neck stiffness.   Skin: Negative for wound.   Neurological: Negative for dizziness, tremors, seizures, facial asymmetry, speech difficulty, weakness, light-headedness, numbness and headaches.   Psychiatric/Behavioral: Negative for confusion and decreased concentration.       Past Medical History:   Diagnosis Date    Allergy     Endometriosis of uterus      Social History     Socioeconomic History    Marital status:      Spouse name: Not on file    Number of children: 1    Years of education: Not on file    Highest education level: Not on file   Occupational History    Occupation: M[employer]   Social Needs    Financial resource strain: Not on file    Food insecurity     Worry: Not on file     Inability: Not on file    Transportation needs     Medical: Not on file     Non-medical: Not on file   Tobacco Use    Smoking status: Current Some Day Smoker     Packs/day: 0.25     Types: Cigarettes    Smokeless tobacco: Never Used   Substance and Sexual Activity    Alcohol use: Yes      "Comment: social    Drug use: No    Sexual activity: Yes     Partners: Male     Birth control/protection: None, Surgical   Lifestyle    Physical activity     Days per week: Not on file     Minutes per session: Not on file    Stress: Not on file   Relationships    Social connections     Talks on phone: Not on file     Gets together: Not on file     Attends [employer] service: Not on file     Active member of club or organization: Not on file     Attends meetings of clubs or organizations: Not on file     Relationship status: Not on file   Other Topics Concern    Not on file   Social History Narrative    Not on file     Family History   Problem Relation Age of Onset    Alzheimer's disease Mother     Cancer Mother     Breast cancer Neg Hx     Ovarian cancer Neg Hx      Review of patient's allergies indicates:   Allergen Reactions    Iodine and iodide containing products     Sulfa (sulfonamide antibiotics)        Current Outpatient Medications:     fluticasone (FLONASE) 50 mcg/actuation nasal spray, APPLY TWO SPRAYS NASALLY ONCE TO TWICE DAILY DEPENDING ON SEVERITY OFSYMPTOMS, Disp: 1 Bottle, Rfl: 0    methocarbamoL (ROBAXIN) 750 MG Tab, Take 1 tablet (750 mg total) by mouth 3 (three) times daily. for 10 days, Disp: 30 tablet, Rfl: 0    predniSONE (DELTASONE) 10 MG tablet, Take 40mg for 2days, take 30mg for 2 days, take 20mg for 2 days, take 10mg for 2 days (Patient not taking: Reported on 8/20/2020), Disp: 20 tablet, Rfl: 0    Vitals:    08/20/20 0839   BP: (!) 145/78   BP Location: Right arm   Patient Position: Sitting   BP Method: Medium (Automatic)   Pulse: 73   Weight: 52.2 kg (115 lb 1.3 oz)   Height: 5' 5" (1.651 m)       Physical Exam  Vitals signs and nursing note reviewed.   Constitutional:       Appearance: She is well-developed.   HENT:      Head: Normocephalic and atraumatic.   Eyes:      Pupils: Pupils are equal, round, and reactive to light.   Neck:      Musculoskeletal: Normal range of " motion and neck supple.   Cardiovascular:      Rate and Rhythm: Normal rate.   Pulmonary:      Effort: Pulmonary effort is normal.   Musculoskeletal: Normal range of motion.   Skin:     General: Skin is warm and dry.   Neurological:      Mental Status: She is alert and oriented to person, place, and time.      Coordination: Finger-Nose-Finger Test, Heel to Shin Test and Romberg Test normal.      Gait: Gait is intact. Tandem walk normal.      Deep Tendon Reflexes:      Reflex Scores:       Tricep reflexes are 2+ on the right side and 2+ on the left side.       Bicep reflexes are 2+ on the right side and 2+ on the left side.       Brachioradialis reflexes are 2+ on the right side and 2+ on the left side.       Patellar reflexes are 2+ on the right side and 2+ on the left side.       Achilles reflexes are 2+ on the right side and 2+ on the left side.  Psychiatric:         Speech: Speech normal.         Behavior: Behavior normal.         Thought Content: Thought content normal.         Judgment: Judgment normal.         Neurologic Exam     Mental Status   Oriented to person, place, and time.   Oriented to person.   Oriented to place.   Oriented to time.   Follows 3 step commands.   Attention: normal. Concentration: normal.   Speech: speech is normal   Level of consciousness: alert  Knowledge: consistent with education.   Able to name object. Able to read. Able to repeat. Able to write. Normal comprehension.     Cranial Nerves     CN II   Visual acuity: normal  Right visual field deficit: none  Left visual field deficit: none     CN III, IV, VI   Pupils are equal, round, and reactive to light.  Right pupil: Size: 3 mm. Shape: regular. Reactivity: brisk. Consensual response: intact.   Left pupil: Size: 3 mm. Shape: regular. Reactivity: brisk. Consensual response: intact.   CN III: no CN III palsy  CN VI: no CN VI palsy  Nystagmus: none   Diplopia: none  Ophthalmoparesis: none  Conjugate gaze: present    CN V   Right  facial sensation deficit: none  Left facial sensation deficit: none    CN VII   Right facial weakness: none  Left facial weakness: none    CN VIII   Hearing: intact    CN IX, X   CN IX normal.   CN X normal.     CN XI   Right sternocleidomastoid strength: normal  Left sternocleidomastoid strength: normal  Right trapezius strength: normal  Left trapezius strength: normal    CN XII   Fasciculations: absent  Tongue deviation: none    Motor Exam   Muscle bulk: normal  Overall muscle tone: normal  Right arm pronator drift: absent  Left arm pronator drift: absent    Strength   Right neck flexion: 5/5  Left neck flexion: 5/5  Right neck extension: 5/5  Left neck extension: 5/5  Right deltoid: 5/5  Left deltoid: 5/5  Right biceps: 5/5  Left biceps: 5/5  Right triceps: 5/5  Left triceps: 5/5  Right wrist flexion: 5/5  Left wrist flexion: 5/5  Right wrist extension: 5/5  Left wrist extension: 5/5  Right interossei: 5/5  Left interossei: 5/5  Right abdominals: 5/5  Left abdominals: 5/5  Right iliopsoas: 5/5  Left iliopsoas: 5/5  Right quadriceps: 5/5  Left quadriceps: 5/5  Right hamstrin/5  Left hamstrin/5  Right glutei: 5/5  Left glutei: 5/5  Right anterior tibial: 5/5  Left anterior tibial: 5/5  Right posterior tibial: 5/5  Left posterior tibial: 5/5  Right peroneal: 5/5  Left peroneal: 5/5  Right gastroc: 5/5  Left gastroc: 5/5    Sensory Exam   Right arm light touch: normal  Left arm light touch: normal  Right leg light touch: normal  Left leg light touch: normal  Right arm vibration: normal  Left arm vibration: normal  Right arm pinprick: normal  Left arm pinprick: normal    Gait, Coordination, and Reflexes     Gait  Gait: normal    Coordination   Romberg: negative  Finger to nose coordination: normal  Heel to shin coordination: normal  Tandem walking coordination: normal    Tremor   Resting tremor: absent  Intention tremor: absent  Action tremor: absent    Reflexes   Right brachioradialis: 2+  Left  brachioradialis: 2+  Right biceps: 2+  Left biceps: 2+  Right triceps: 2+  Left triceps: 2+  Right patellar: 2+  Left patellar: 2+  Right achilles: 2+  Left achilles: 2+  Right Garrison: absent  Left Garrison: absent  Right ankle clonus: absent  Left ankle clonus: absent      Provider dictation:  74 year old female who is relatively healthy presents for evaluation of back pain under workers comp.  She was moving some items around [employer], where she works, when she developed pain in the back.  Pain is felt in the left side of the lower back without any radiation into the lower extremities.  Denies numbness, tingling, weakness.  She was seen in urgent care on two occations  She is taking robaxin, aleve, and otc pain patches. She has completed a steroid taper.  She has not had PT or MARCE.    Current medications: robaxin,aleve, OTC pain patch  Medications tried:oral steroid taper  Physical therapy:  none  Interventional Procedures:  none    Oswestry score: 34%.  PHQ:  1.    On exam, she has 5/5 strength with 2+ DTR and no sensory deficits.  Gait and station fluid.  No tenderness along the spine.  No pain with axial facet loading.    Xray lumbar spine from 8-12-20 personally reviewed.  There are minimal/ mild degenerative changes with good alignment. No fracture noted.    At this time, Ms. Pearce has acute onset left sided lower back pain without radiculopathy, neurological deficits and no significant abnormality noted on xray.  Symptoms are consistent with myofascial strain/ spasm and should go on to improve given some more time and relative rest.  She can take aleve twice daily for pain.  I am refilling robaxin to help with spasms.  She may return to work with recommendation of no lifting greater than 15 pounds for the next 4 weeks.  If pain persists or worsens we can refer to PT.  Follow up as needed.      Visit Diagnosis:  Acute left-sided low back pain without sciatica        Total time spent counseling greater than  fifty percent of total visit time.  Counseling included discussion regarding imaging findings, diagnosis possibilities, treatment options, risks and benefits.   The patient had many questions regarding the options and long-term effects.

## 2020-09-11 ENCOUNTER — OFFICE VISIT (OUTPATIENT)
Dept: SPINE | Facility: CLINIC | Age: 75
End: 2020-09-11
Payer: COMMERCIAL

## 2020-09-11 VITALS
HEIGHT: 65 IN | WEIGHT: 112.31 LBS | BODY MASS INDEX: 18.71 KG/M2 | SYSTOLIC BLOOD PRESSURE: 146 MMHG | DIASTOLIC BLOOD PRESSURE: 75 MMHG | HEART RATE: 78 BPM

## 2020-09-11 DIAGNOSIS — M54.42 ACUTE LEFT-SIDED LOW BACK PAIN WITH LEFT-SIDED SCIATICA: Primary | ICD-10-CM

## 2020-09-11 DIAGNOSIS — M54.9 DORSALGIA, UNSPECIFIED: ICD-10-CM

## 2020-09-11 PROCEDURE — 99999 PR PBB SHADOW E&M-EST. PATIENT-LVL III: CPT | Mod: PBBFAC,,, | Performed by: PHYSICIAN ASSISTANT

## 2020-09-11 PROCEDURE — 99213 OFFICE O/P EST LOW 20 MIN: CPT | Mod: S$GLB,,, | Performed by: PHYSICIAN ASSISTANT

## 2020-09-11 PROCEDURE — 99213 PR OFFICE/OUTPT VISIT, EST, LEVL III, 20-29 MIN: ICD-10-PCS | Mod: S$GLB,,, | Performed by: PHYSICIAN ASSISTANT

## 2020-09-11 PROCEDURE — 99999 PR PBB SHADOW E&M-EST. PATIENT-LVL III: ICD-10-PCS | Mod: PBBFAC,,, | Performed by: PHYSICIAN ASSISTANT

## 2020-09-11 NOTE — PROGRESS NOTES
Back and Spine Consult    Patient ID: Lilli Stringer is a 74 y.o. female.    Chief Complaint   Patient presents with    Low-back Pain     She now has pain in the left buttock and it radiates down her left leg to the left knee.       Review of Systems   Constitutional: Negative for activity change, appetite change, chills, fever and unexpected weight change.   HENT: Negative for tinnitus, trouble swallowing and voice change.    Respiratory: Negative for apnea, cough, chest tightness and shortness of breath.    Cardiovascular: Negative for chest pain and palpitations.   Gastrointestinal: Negative for constipation, diarrhea, nausea and vomiting.   Genitourinary: Negative for difficulty urinating, dysuria, frequency and urgency.   Musculoskeletal: Positive for back pain. Negative for gait problem, neck pain and neck stiffness.   Skin: Negative for wound.   Neurological: Negative for dizziness, tremors, seizures, facial asymmetry, speech difficulty, weakness, light-headedness, numbness and headaches.   Psychiatric/Behavioral: Negative for confusion and decreased concentration.       Past Medical History:   Diagnosis Date    Allergy     Endometriosis of uterus      Social History     Socioeconomic History    Marital status:      Spouse name: Not on file    Number of children: 1    Years of education: Not on file    Highest education level: Not on file   Occupational History    Occupation: [employer]   Social Needs    Financial resource strain: Not on file    Food insecurity     Worry: Not on file     Inability: Not on file    Transportation needs     Medical: Not on file     Non-medical: Not on file   Tobacco Use    Smoking status: Current Some Day Smoker     Packs/day: 0.25     Types: Cigarettes    Smokeless tobacco: Never Used   Substance and Sexual Activity    Alcohol use: Yes     Comment: social    Drug use: No    Sexual activity: Yes     Partners: Male     Birth control/protection:  None, Surgical   Lifestyle    Physical activity     Days per week: Not on file     Minutes per session: Not on file    Stress: Not on file   Relationships    Social connections     Talks on phone: Not on file     Gets together: Not on file     Attends Jehovah's witness service: Not on file     Active member of club or organization: Not on file     Attends meetings of clubs or organizations: Not on file     Relationship status: Not on file   Other Topics Concern    Not on file   Social History Narrative    Not on file     Family History   Problem Relation Age of Onset    Alzheimer's disease Mother     Cancer Mother     Breast cancer Neg Hx     Ovarian cancer Neg Hx      Review of patient's allergies indicates:   Allergen Reactions    Iodine and iodide containing products     Sulfa (sulfonamide antibiotics)        Current Outpatient Medications:     fluticasone (FLONASE) 50 mcg/actuation nasal spray, APPLY TWO SPRAYS NASALLY ONCE TO TWICE DAILY DEPENDING ON SEVERITY OFSYMPTOMS, Disp: 1 Bottle, Rfl: 0    methocarbamoL (ROBAXIN) 750 MG Tab, Take 1 tablet (750 mg total) by mouth 2 (two) times daily as needed (muscle spasms/ pain)., Disp: 30 tablet, Rfl: 0    predniSONE (DELTASONE) 10 MG tablet, Take 40mg for 2days, take 30mg for 2 days, take 20mg for 2 days, take 10mg for 2 days (Patient not taking: Reported on 8/20/2020), Disp: 20 tablet, Rfl: 0    There were no vitals filed for this visit.    Physical Exam  Vitals signs and nursing note reviewed.   Constitutional:       Appearance: She is well-developed.   HENT:      Head: Normocephalic and atraumatic.   Eyes:      Pupils: Pupils are equal, round, and reactive to light.   Neck:      Musculoskeletal: Normal range of motion and neck supple.   Cardiovascular:      Rate and Rhythm: Normal rate.   Pulmonary:      Effort: Pulmonary effort is normal.   Musculoskeletal: Normal range of motion.   Skin:     General: Skin is warm and dry.   Neurological:      Mental Status:  She is alert and oriented to person, place, and time.      Coordination: Finger-Nose-Finger Test, Heel to Shin Test and Romberg Test normal.      Gait: Gait is intact. Tandem walk normal.      Deep Tendon Reflexes:      Reflex Scores:       Tricep reflexes are 2+ on the right side and 2+ on the left side.       Bicep reflexes are 2+ on the right side and 2+ on the left side.       Brachioradialis reflexes are 2+ on the right side and 2+ on the left side.       Patellar reflexes are 2+ on the right side and 2+ on the left side.       Achilles reflexes are 2+ on the right side and 2+ on the left side.  Psychiatric:         Speech: Speech normal.         Behavior: Behavior normal.         Thought Content: Thought content normal.         Judgment: Judgment normal.         Neurologic Exam     Mental Status   Oriented to person, place, and time.   Oriented to person.   Oriented to place.   Oriented to time.   Follows 3 step commands.   Attention: normal. Concentration: normal.   Speech: speech is normal   Level of consciousness: alert  Knowledge: consistent with education.   Able to name object. Able to read. Able to repeat. Able to write. Normal comprehension.     Cranial Nerves     CN II   Visual acuity: normal  Right visual field deficit: none  Left visual field deficit: none     CN III, IV, VI   Pupils are equal, round, and reactive to light.  Right pupil: Size: 3 mm. Shape: regular. Reactivity: brisk. Consensual response: intact.   Left pupil: Size: 3 mm. Shape: regular. Reactivity: brisk. Consensual response: intact.   CN III: no CN III palsy  CN VI: no CN VI palsy  Nystagmus: none   Diplopia: none  Ophthalmoparesis: none  Conjugate gaze: present    CN V   Right facial sensation deficit: none  Left facial sensation deficit: none    CN VII   Right facial weakness: none  Left facial weakness: none    CN VIII   Hearing: intact    CN IX, X   CN IX normal.   CN X normal.     CN XI   Right sternocleidomastoid strength:  normal  Left sternocleidomastoid strength: normal  Right trapezius strength: normal  Left trapezius strength: normal    CN XII   Fasciculations: absent  Tongue deviation: none    Motor Exam   Muscle bulk: normal  Overall muscle tone: normal  Right arm pronator drift: absent  Left arm pronator drift: absent    Strength   Right neck flexion: 5/5  Left neck flexion: 5/5  Right neck extension: 5/5  Left neck extension: 5/5  Right deltoid: 5/5  Left deltoid: 5/5  Right biceps: 5/5  Left biceps: 5/5  Right triceps: 5/5  Left triceps: 5/5  Right wrist flexion: 5/5  Left wrist flexion: 5/5  Right wrist extension: 5/5  Left wrist extension: 5/5  Right interossei: 55  Left interossei: /  Right abdominals: 5  Left abdominals: 5  Right iliopsoas:   Left iliopsoas: 5/  Right quadriceps: 5/5  Left quadriceps: 5/  Right hamstrin/5  Left hamstrin/5  Right glutei:   Left glutei:   Right anterior tibial: 5/5  Left anterior tibial: 5/  Right posterior tibial: 5/  Left posterior tibial: 5/  Right peroneal: 5  Left peroneal:   Right gastroc: 5/  Left gastroc:     Sensory Exam   Right arm light touch: normal  Left arm light touch: normal  Right leg light touch: normal  Left leg light touch: normal  Right arm vibration: normal  Left arm vibration: normal  Right arm pinprick: normal  Left arm pinprick: normal    Gait, Coordination, and Reflexes     Gait  Gait: normal    Coordination   Romberg: negative  Finger to nose coordination: normal  Heel to shin coordination: normal  Tandem walking coordination: normal    Tremor   Resting tremor: absent  Intention tremor: absent  Action tremor: absent    Reflexes   Right brachioradialis: 2+  Left brachioradialis: 2+  Right biceps: 2+  Left biceps: 2+  Right triceps: 2+  Left triceps: 2+  Right patellar: 2+  Left patellar: 2+  Right achilles: 2+  Left achilles: 2+  Right Garrison: absent  Left Garrison: absent  Right ankle clonus: absent  Left ankle clonus:  absent      Provider dictation:  74 year old female who is relatively healthy presents for follow up of back pain under workers comp.  She was moving some items around [employer], where she works, when she developed pain in the back.  Pain was initially felt in the left side of the lower back without any radiation into the lower extremities.  She is taking robaxin, aleve, and otc pain patches. She has completed a steroid taper. Since last visit, pain has worsened.  She now feels pain in the left lower back with radiation into the left posterior thigh to the knee.  Denies numbness/ tingling.  She has not had PT or MARCE.    Current medications: robaxin,aleve, icy hot  Medications tried:oral steroid taper  Physical therapy:  none  Interventional Procedures:  none    Oswestry score: 34%.  PHQ:  1.    On exam, she has 5/5 strength with 2+ DTR and no sensory deficits.  Gait and station fluid.  No tenderness along the spine.  No pain with axial facet loading.    Xray lumbar spine from 8-12-20 personally reviewed.  There are minimal/ mild degenerative changes with good alignment. No fracture noted.    Ms. Pearce now has left sided lower back pain with left thigh radiculopathy.  With no neurological deficits and no significant abnormality noted on xray pain is myofascial and potential neural irritation as well.  She has not had any improvement.  Recommend she conntinue with robaxian and ibuprofen for pain control.  I am referring her to PT and recommending obtaining an MRI.  Follow up with me in 6 weeks to monitor progress and review results of imaging.      Regarding work, I recommend limiting lifting to no more than 10 pounds.  She may work light duty with limited hours - 4 hours per day 3 days per week.      Visit Diagnosis:  Acute left-sided low back pain with left-sided sciatica  -     Ambulatory referral/consult to Physical/Occupational Therapy; Future; Expected date: 09/11/2020  -     MRI Lumbar Spine Without Contrast;  Future; Expected date: 09/11/2020    Dorsalgia, unspecified        Total time spent counseling greater than fifty percent of total visit time.  Counseling included discussion regarding imaging findings, diagnosis possibilities, treatment options, risks and benefits.   The patient had many questions regarding the options and long-term effects.

## 2020-09-14 ENCOUNTER — TELEPHONE (OUTPATIENT)
Dept: SPINE | Facility: CLINIC | Age: 75
End: 2020-09-14

## 2020-09-14 NOTE — TELEPHONE ENCOUNTER
----- Message from Gela Sal sent at 9/14/2020  8:21 AM CDT -----  Regarding: Pt advice  Contact: Pt  Type:  Patient Returning Call    Who Called:  pt  Who Left Message for Patient:  Attention to Maged  Does the patient know what this is regarding?:  please follow up with pt regarding last appt, pt stated that she has questions to follow up with the nurse about  Best Call Back Number:    Additional Information:  Thank you

## 2020-09-14 NOTE — TELEPHONE ENCOUNTER
Julian spoke with Ms. Stringer and she said she tried to go back to work on Saturday and was unable to complete her shift. She is asking if you would give her a letter excusing her from work for 6 weeks. Please advise.

## 2020-09-24 ENCOUNTER — TELEPHONE (OUTPATIENT)
Dept: SPINE | Facility: CLINIC | Age: 75
End: 2020-09-24

## 2020-09-24 NOTE — TELEPHONE ENCOUNTER
Spoke with patient and she is scheduled to have her MRI today at [facility]. She has been scheduled to follow-up with Alysha Vang for results tomorrow, she indicated understanding.

## 2020-09-24 NOTE — TELEPHONE ENCOUNTER
----- Message from Portia Turner sent at 9/24/2020  2:46 PM CDT -----  Contact: self  Patient is requestign a call back from Maged she dropped off the disk from her mRI and also wanted to share one more thing. Call back at [phone].  Thanks

## 2020-09-24 NOTE — TELEPHONE ENCOUNTER
Returned call, Portia answered and then we got disconnected. I tried to call her back and got her voicemail, left return call message.

## 2020-09-25 ENCOUNTER — OFFICE VISIT (OUTPATIENT)
Dept: SPINE | Facility: CLINIC | Age: 75
End: 2020-09-25
Payer: MEDICARE

## 2020-09-25 VITALS
BODY MASS INDEX: 18.73 KG/M2 | DIASTOLIC BLOOD PRESSURE: 76 MMHG | WEIGHT: 112.44 LBS | SYSTOLIC BLOOD PRESSURE: 155 MMHG | HEIGHT: 65 IN | HEART RATE: 80 BPM

## 2020-09-25 DIAGNOSIS — M47.816 LUMBAR SPONDYLOSIS: ICD-10-CM

## 2020-09-25 DIAGNOSIS — M54.42 ACUTE LEFT-SIDED LOW BACK PAIN WITH LEFT-SIDED SCIATICA: Primary | ICD-10-CM

## 2020-09-25 DIAGNOSIS — M47.816 FACET ARTHROPATHY, LUMBAR: ICD-10-CM

## 2020-09-25 PROCEDURE — 1125F AMNT PAIN NOTED PAIN PRSNT: CPT | Mod: S$GLB,,, | Performed by: PHYSICIAN ASSISTANT

## 2020-09-25 PROCEDURE — 3008F BODY MASS INDEX DOCD: CPT | Mod: CPTII,S$GLB,, | Performed by: PHYSICIAN ASSISTANT

## 2020-09-25 PROCEDURE — 3008F PR BODY MASS INDEX (BMI) DOCUMENTED: ICD-10-PCS | Mod: CPTII,S$GLB,, | Performed by: PHYSICIAN ASSISTANT

## 2020-09-25 PROCEDURE — 1159F MED LIST DOCD IN RCRD: CPT | Mod: S$GLB,,, | Performed by: PHYSICIAN ASSISTANT

## 2020-09-25 PROCEDURE — 1101F PR PT FALLS ASSESS DOC 0-1 FALLS W/OUT INJ PAST YR: ICD-10-PCS | Mod: CPTII,S$GLB,, | Performed by: PHYSICIAN ASSISTANT

## 2020-09-25 PROCEDURE — 1159F PR MEDICATION LIST DOCUMENTED IN MEDICAL RECORD: ICD-10-PCS | Mod: S$GLB,,, | Performed by: PHYSICIAN ASSISTANT

## 2020-09-25 PROCEDURE — 1125F PR PAIN SEVERITY QUANTIFIED, PAIN PRESENT: ICD-10-PCS | Mod: S$GLB,,, | Performed by: PHYSICIAN ASSISTANT

## 2020-09-25 PROCEDURE — 99214 PR OFFICE/OUTPT VISIT, EST, LEVL IV, 30-39 MIN: ICD-10-PCS | Mod: S$GLB,,, | Performed by: PHYSICIAN ASSISTANT

## 2020-09-25 PROCEDURE — 99214 OFFICE O/P EST MOD 30 MIN: CPT | Mod: S$GLB,,, | Performed by: PHYSICIAN ASSISTANT

## 2020-09-25 PROCEDURE — 99999 PR PBB SHADOW E&M-EST. PATIENT-LVL III: CPT | Mod: PBBFAC,,, | Performed by: PHYSICIAN ASSISTANT

## 2020-09-25 PROCEDURE — 99999 PR PBB SHADOW E&M-EST. PATIENT-LVL III: ICD-10-PCS | Mod: PBBFAC,,, | Performed by: PHYSICIAN ASSISTANT

## 2020-09-25 PROCEDURE — 1101F PT FALLS ASSESS-DOCD LE1/YR: CPT | Mod: CPTII,S$GLB,, | Performed by: PHYSICIAN ASSISTANT

## 2020-09-25 RX ORDER — NAPROXEN SODIUM 220 MG
220 TABLET ORAL NIGHTLY
COMMUNITY
End: 2021-11-16 | Stop reason: ALTCHOICE

## 2020-09-29 ENCOUNTER — CLINICAL SUPPORT (OUTPATIENT)
Dept: REHABILITATION | Facility: HOSPITAL | Age: 75
End: 2020-09-29
Payer: MEDICARE

## 2020-09-29 DIAGNOSIS — M54.42 ACUTE LEFT-SIDED LOW BACK PAIN WITH LEFT-SIDED SCIATICA: ICD-10-CM

## 2020-09-29 DIAGNOSIS — G89.29 CHRONIC BILATERAL LOW BACK PAIN WITH LEFT-SIDED SCIATICA: Primary | ICD-10-CM

## 2020-09-29 DIAGNOSIS — M62.81 MUSCLE WEAKNESS: ICD-10-CM

## 2020-09-29 DIAGNOSIS — M54.42 CHRONIC BILATERAL LOW BACK PAIN WITH LEFT-SIDED SCIATICA: Primary | ICD-10-CM

## 2020-09-29 PROCEDURE — 97162 PT EVAL MOD COMPLEX 30 MIN: CPT | Mod: PN | Performed by: PHYSICAL THERAPIST

## 2020-09-29 PROCEDURE — 97110 THERAPEUTIC EXERCISES: CPT | Mod: PN | Performed by: PHYSICAL THERAPIST

## 2020-09-29 NOTE — PLAN OF CARE
[facility]  Physical Therapy Initial Evaluation    Name: Lilli Stringer  Clinic Number: [id number]    Therapy Diagnosis:   Encounter Diagnoses   Name Primary?    Acute left-sided low back pain with left-sided sciatica     Chronic bilateral low back pain with left-sided sciatica Yes    Muscle weakness      Physician: Alysha Vang PA-C    Physician Orders: PT Eval and Treat 2 x week 30 days  Medical Diagnosis from Referral: M54.42 (ICD-10-CM) - Acute left-sided low back pain with left-sided sciatica  Evaluation Date: 9/29/2020  Authorization Period Expiration: 9-11-21  Plan of Care Expiration: 10-29-20  Visit # / Visits authorized: 1/ 12  MD Follow up appointment: 11-6-20    Time In: 9:21  Time Out: 10:01  Total Billable Time: 40 minutes    Precautions: Standard    Subjective   Date of onset: 9-5-20  History of current condition - Portia reports: she was moving a lot of things at work and felt pain.  Pt went to Urgent care and did x-ray and medication and was not going away. Went to another MD and had MRI and different meds and was told she had mild arthritis Pt states still on muscle relaxer and Aleve Pt states across LB initially and now has progressed to L hip and down lateral thigh and sometimes to mid calf like a dull ache sometimes she gets sharp pain     Medical History:   Past Medical History:   Diagnosis Date    Allergy     Endometriosis of uterus        Surgical History:   Lilli Stringer  has a past surgical history that includes Tubal ligation; Breast surgery (1980s); and Dilation and curettage of uterus.    Medications:   Lilli has a current medication list which includes the following prescription(s): fluticasone propionate, methocarbamol, naproxen sodium, and prednisone.    Allergies:   Review of patient's allergies indicates:   Allergen Reactions    Iodine and iodide containing products     Sulfa (sulfonamide antibiotics)         Imaging,  x-ray: Alignment is normal.  There is mild DJD.  No fracture dislocation bone destruction seen.  No trauma seen.  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  By the time pt can go back to work while pt is on medical leave for 6 weeks the store will be officially closed   Prior Level of Function: no limitations  Current Level of Function: increased pain with walking, standing and lifting, unable to do grocery store for unable to lift able to drive and can do light things  Exercise for Wellness: walked a mile a day, but unable to do that anymore    Pain:  Current 4/10, worst 9/10, best 4/10   Location: bilateral LB and into L LE  Description: Aching, Dull and Sharp  Aggravating Factors: Standing, Walking and Lifting  Easing Factors: sitting   Sleep is not disturbed.     Pts goals: get back to normal    Objective     Postural examination in standing:  - normal lumbar lordosis  - forward head  - forward shoulders  - R hip high  - L shoulder high    Postural examination in sitting:   - normal lumbar lordosis  - forward head  - forward shoulders      Functional assessment:   - walking: antalgic L  - sit to stand: min difficulty  - sit to supine: min difficulty       - supine to sit: mod difficulty, had to work with pt to log roll to transfer   - supine to prone: not tested    Lumbar active range of motion in standing is:  - flexion - mid thigh                     - extension -  25%                         - left side bending -  Mid thigh         - right side bending -  Mid thigh   Pain all planes          Pelvic positioning: AR R      Flexibility testing:  - hamstrings:     90/90 test R 20 L 30           - gastrocnemius:    DF ankle R 5 degrees L 5 degrees      Muscle Strength Pt with acute pain with active motion of L LE so deferred further MMT  MMT R L   Hip flexion 3+/5 3/5   Knee extension 5/5 5/5   Knee flexion 5/5 5/5     Lumbar Special tests:  SLR neg    Palpation: no significant TTP    Joint mobility: Not tested     Sensation: Intact      CMS Impairment/Limitation/Restriction for FOTO lumbar Survey    Therapist reviewed FOTO scores for Lilli Stringer on 9/29/2020.   FOTO documents entered into EPIC - see Media section.    Limitation Score: 46%       TREATMENT   Treatment Time In: 9:50  Treatment Time Out: 10:01  Total Treatment time separate from Evaluation: 11 minutes    Portia received therapeutic exercises to develop strength, endurance, ROM, flexibility and core stabilization for 11 minutes including:  HS stretch x 10  Piriformis stretch x 10  Bridge x 10  Hip abd/add supine  x 10  Push/pull x 3    Instructed pt to ice as needed to address any soreness which may occur.      Home Exercises and Patient Education Provided    Education provided:   - HEP  - Instructed pt in increased awareness of symptoms.  Instructed pt in push/pull at onset of increased symptoms.      Written Home Exercises Provided: Yes   Exercises were reviewed and Portia was able to demonstrate them prior to the end of the session.  Portia demonstrated good  understanding of the education provided.     See EMR under Patient Instructions for exercises provided 9/29/2020.    Assessment   Lilli is a 74 y.o. female referred to outpatient Physical Therapy with a medical diagnosis of M54.42 (ICD-10-CM) - Acute left-sided low back pain with left-sided sciatica. Pt presents with low back pain with L sciatica with pelvic dysfunction with loss of ROM and strength.  Pt injured her back with lifting at work, but will not be going back to that job as the store will be closing and she will be retiring.  Pt will need improvement in functional lifting for ADL as  with groceries     Pt prognosis is Good.   Pt will benefit from skilled outpatient Physical Therapy to address the deficits stated above and in the chart below, provide pt/family education, and to maximize pt's level of independence.     Plan of care discussed with patient: Yes  Pt's spiritual, cultural and educational needs considered and patient is agreeable to the plan of care and goals as stated below:     Anticipated Barriers for therapy: none    Medical Necessity is demonstrated by the following  History  Co-morbidities and personal factors that may impact the plan of care Co-morbidities:   advanced age    Personal Factors:   no deficits     moderate   Examination  Body Structures and Functions, activity limitations and participation restrictions that may impact the plan of care Body Regions:   back  lower extremities  trunk    Body Systems:    ROM  strength    Participation Restrictions:   Walking for exercise    Activity limitations:   Learning and applying knowledge  no deficits    General Tasks and Commands  no deficits    Communication  no deficits    Mobility  lifting and carrying objects  walking    Self care  no deficits    Domestic Life  shopping  cooking  doing house work (cleaning house, washing dishes, laundry)  assisting others    Interactions/Relationships  no deficits    Life Areas  no deficits    Community and Social Life  no deficits         moderate   Clinical Presentation evolving clinical presentation with changing clinical characteristics moderate   Decision Making/ Complexity Score: moderate     GOALS:   Short Term Goals:  2 weeks  Increase range of motion 25%  Increase strength 1/2 muscle grade  Improve postural awareness of pelvis to independently identify dysfunction with min assist from PT  Be able to perform HEP with minimal cueing required    Long Term Goals: 30 days  Increase range of motion to 75% to 100% full   Improve muscle strength 1 muscle grade  Improve and stabilize proper  pelvic positioning  Restore ability to ambulate with normal pain free gait  Walking for ADL and exercise will be restored without increased pain  Restore ability to stand for ADL without increased pain  Restore ability to lift and carry items such as groceries without increased pain  Restore ability to perform ADL's and household activities independently and without increased pain    Plan   Plan of care Certification: 9/29/2020 to 10-29-20.    Outpatient Physical Therapy 2 times weekly for 30 days to include the following interventions: Therapeutic exercises, manual therapy, neuromuscular re-education, therapeutic activities, modalities such as moist heat, ice, ultrasound and electrical stimulation, lumbar mechanical traction and dry needling will be considered and utilized as needed    Corine Andre, PT

## 2020-10-01 ENCOUNTER — CLINICAL SUPPORT (OUTPATIENT)
Dept: REHABILITATION | Facility: HOSPITAL | Age: 75
End: 2020-10-01
Payer: COMMERCIAL

## 2020-10-01 DIAGNOSIS — M62.81 MUSCLE WEAKNESS: ICD-10-CM

## 2020-10-01 DIAGNOSIS — G89.29 CHRONIC BILATERAL LOW BACK PAIN WITH LEFT-SIDED SCIATICA: ICD-10-CM

## 2020-10-01 DIAGNOSIS — M54.42 CHRONIC BILATERAL LOW BACK PAIN WITH LEFT-SIDED SCIATICA: ICD-10-CM

## 2020-10-01 PROCEDURE — 97110 THERAPEUTIC EXERCISES: CPT | Mod: PN | Performed by: PHYSICAL THERAPIST

## 2020-10-01 NOTE — PROGRESS NOTES
Physical Therapy Daily Treatment Note     Name: Lilli Foote [place]  Clinic Number: [id number]    Therapy Diagnosis:   Encounter Diagnoses   Name Primary?    Chronic bilateral low back pain with left-sided sciatica     Muscle weakness      Physician: Alysha Vang PA-C    Visit Date: 10/1/2020    Physician Orders: PT Eval and Treat 2 x week 30 days  Medical Diagnosis from Referral: M54.42 (ICD-10-CM) - Acute left-sided low back pain with left-sided sciatica  Evaluation Date: 9/29/2020  Authorization Period Expiration: 9-11-21  Plan of Care Expiration: 10-29-20  Visit # / Visits authorized: 2/ 12  MD Follow up appointment: 11-6-20    Time In: 11:01  Time Out: 11:55  Total Billable Time: 54 minutes    Precautions: Standard    Subjective     Pt reports: when woke up L side pain.  As walked around got better, still some pain .  Pt was compliant with home exercise program.  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    Pt with pelvic dysfunction.  Pt had not recently performed push/pull ex.  Pt performed push/pull exercise and unable to note positive change in symptoms.    HS stretch x 10  Piriformis stretch x 10  Bridge x 15   Pelvic tilt x 10   Clam sidelying x 10  Push/pull x 3  At end of session worked with pt further on identification of symptoms and pt able to note + change in symptoms after treatment.  Pt required much verbal cueing for proper performance  and recruitment, but did improve as session went on.   Instructed pt in flow of HEP to make sure and do push/pull last and in between as needed.      Home Exercises Provided and Patient Education Provided     Education provided:   - HEP  - Instructed pt in increased awareness of symptoms.  Instructed pt in push/pull at onset of increased symptoms.      Written Home Exercises Provided: yes.  Exercises were reviewed and Portia was able to demonstrate them prior to the end of the session.  Portia demonstrated good  understanding of the education provided.     See EMR under Patient Instructions for exercises provided at initial eval and 10/1/2020.    Assessment   Pt able to move with more ease and able to progress with strengthening  Pt requires much cueing and did bring her HEP sheets and appears to have improved understanding of how to perform HEP  Pt with decreased pain after treatment and ar treatment well    Portia is progressing well towards her goals.   Pt prognosis is Good.     Pt will continue to benefit from skilled outpatient physical therapy to address the deficits listed in the problem list box on initial evaluation, provide pt/family education and to maximize pt's level of independence in the home and community environment.     Pt's spiritual, cultural and educational needs considered and pt agreeable to plan of care and goals.     Anticipated barriers to physical therapy: none    GOALS:   Short Term Goals:  2 weeks  Increase range of motion 25%  Increase strength 1/2 muscle grade  Improve postural awareness of pelvis to independently identify dysfunction with min assist from PT  Be able to perform HEP with minimal cueing required     Long Term Goals: 30 days  Increase range of motion to 75% to 100% full   Improve muscle strength 1 muscle grade  Improve and stabilize proper pelvic positioning  Restore ability to ambulate with normal pain free gait  Walking for ADL and exercise will be restored without increased  pain  Restore ability to stand for ADL without increased pain  Restore ability to lift and carry items such as groceries without increased pain  Restore ability to perform ADL's and household activities independently and without increased pain       Plan   Continue with established plan of care towards PT goals.      Progress with strength and stab as tolerated.      Corine Andre, PT

## 2020-10-06 ENCOUNTER — CLINICAL SUPPORT (OUTPATIENT)
Dept: REHABILITATION | Facility: HOSPITAL | Age: 75
End: 2020-10-06
Payer: COMMERCIAL

## 2020-10-06 DIAGNOSIS — G89.29 CHRONIC BILATERAL LOW BACK PAIN WITH LEFT-SIDED SCIATICA: ICD-10-CM

## 2020-10-06 DIAGNOSIS — M62.81 MUSCLE WEAKNESS: ICD-10-CM

## 2020-10-06 DIAGNOSIS — M54.42 CHRONIC BILATERAL LOW BACK PAIN WITH LEFT-SIDED SCIATICA: ICD-10-CM

## 2020-10-06 PROCEDURE — 97110 THERAPEUTIC EXERCISES: CPT | Mod: PN,CQ

## 2020-10-06 NOTE — PROGRESS NOTES
"  Physical Therapy Daily Treatment Note     Name: Lilli Foote [place]  Clinic Number: [id number]    Therapy Diagnosis:   Encounter Diagnoses   Name Primary?    Chronic bilateral low back pain with left-sided sciatica     Muscle weakness      Physician: Alysha Vang PA-C    Visit Date: 10/6/2020    Physician Orders: PT Eval and Treat 2 x week 30 days  Medical Diagnosis from Referral: M54.42 (ICD-10-CM) - Acute left-sided low back pain with left-sided sciatica  Evaluation Date: 9/29/2020  Authorization Period Expiration: 9-11-21  Plan of Care Expiration: 10-29-20  Visit # / Visits authorized: 3 / 12  MD Follow up appointment: 11-6-20    Time In: 9:20  Time Out: 10:03  Total Billable Time: 43 minutes    Precautions: Standard    Subjective     Pt reports: overall doing a little better. Still having that "numbing" sensation along the L sided low back. Has not done the push/pull since last night.  Pt was compliant with home exercise program.  Response to previous treatment: no soreness, felt a little better  Functional change: able to walk without stabbing pain    Pain: 2/10 to start  At end of session 0-1/10  Location: bilateral LB and L leg     Objective   RI L pelvis     TREATMENT    Portia received therapeutic exercises to develop strength, endurance, ROM, flexibility and core stabilization for 43 minutes including:  Discussion on importance and rationale of log roll when getting into and out of bed. Patient given education on bony anatomy and mechanics with forward fold supine to sit versus log roll to sit transfers. Patient gave verbal understanding and demonstrated good technique with practice.    Pt with pelvic dysfunction.  Pt had not recently performed push/pull ex.  Pt performed push/pull exercise and unable to note positive change in symptoms.     Patient with habit of sitting straight up in bed versus log-rolling to side - reviewed proper log-roll transfer to protect pelvis and prevent onset of " dysfunction with sitting up.  HS stretch x 10  Piriformis stretch x 10  Bridge x 20  Pelvic tilt x 15  Clam sidelying x 10   LTR x 10   SLR x 10 each  Push/pull x 3  At end of session worked with pt further on identification of symptoms and pt able to note + change in symptoms after treatment.  Pt required much verbal cueing for proper performance and recruitment, but did improve as session went on.   Instructed pt in flow of HEP to make sure and do push/pull last, in between as needed, and at any point throughout the day if symptoms increase.      Home Exercises Provided and Patient Education Provided     Education provided:   - HEP  - Instructed pt in increased awareness of symptoms.  Instructed pt in push/pull at onset of increased symptoms.      Written Home Exercises Provided: yes - given images of proper technique for log roll transfers supine<->sitting  Exercises were reviewed and Portia was able to demonstrate them prior to the end of the session.  Portia demonstrated good  understanding of the education provided.     See EMR under Patient Instructions for exercises provided at initial eval and 10/1/2020.    Assessment   Pt demonstrates good understanding of need for log roll when getting into and out of bed to protect pelvic alignment. Improved understanding of need for push/pull throughout day as needed when onset of symptoms occur. Good tolerance to exercises this date, able to increase reps of several without onset of discomfort of pelvic dysfunction. Reduction of symptoms post treatment.    Portia is progressing well towards her goals.   Pt prognosis is Good.     Pt will continue to benefit from skilled outpatient physical therapy to address the deficits listed in the problem list box on initial evaluation, provide pt/family education and to maximize pt's level of independence in the home and community environment.     Pt's spiritual, cultural and educational needs considered and pt agreeable to plan of care  and goals.     Anticipated barriers to physical therapy: none    GOALS:   Short Term Goals:  2 weeks  Increase range of motion 25%  Increase strength 1/2 muscle grade  Improve postural awareness of pelvis to independently identify dysfunction with min assist from PT  Be able to perform HEP with minimal cueing required     Long Term Goals: 30 days  Increase range of motion to 75% to 100% full   Improve muscle strength 1 muscle grade  Improve and stabilize proper pelvic positioning  Restore ability to ambulate with normal pain free gait  Walking for ADL and exercise will be restored without increased pain  Restore ability to stand for ADL without increased pain  Restore ability to lift and carry items such as groceries without increased pain  Restore ability to perform ADL's and household activities independently and without increased pain       Plan   Continue with established plan of care towards PT goals.      Progress with strength and stab as tolerated.      Raegan Lauren, PTA

## 2020-10-13 ENCOUNTER — CLINICAL SUPPORT (OUTPATIENT)
Dept: REHABILITATION | Facility: HOSPITAL | Age: 75
End: 2020-10-13
Payer: COMMERCIAL

## 2020-10-13 DIAGNOSIS — M54.42 CHRONIC BILATERAL LOW BACK PAIN WITH LEFT-SIDED SCIATICA: ICD-10-CM

## 2020-10-13 DIAGNOSIS — G89.29 CHRONIC BILATERAL LOW BACK PAIN WITH LEFT-SIDED SCIATICA: ICD-10-CM

## 2020-10-13 DIAGNOSIS — M62.81 MUSCLE WEAKNESS: ICD-10-CM

## 2020-10-13 PROCEDURE — 97110 THERAPEUTIC EXERCISES: CPT | Mod: PN

## 2020-10-13 PROCEDURE — 97140 MANUAL THERAPY 1/> REGIONS: CPT | Mod: PN

## 2020-10-13 NOTE — PROGRESS NOTES
"    Physical Therapy Daily Treatment Note     Name: Lilli Foote [place]  Clinic Number: [id number]    Therapy Diagnosis:   Encounter Diagnoses   Name Primary?    Chronic bilateral low back pain with left-sided sciatica     Muscle weakness      Physician: lAysha Vang PA-C    Visit Date: 10/13/2020    Physician Orders: PT Eval and Treat 2 x week 30 days  Medical Diagnosis from Referral: M54.42 (ICD-10-CM) - Acute left-sided low back pain with left-sided sciatica  Evaluation Date: 9/29/2020  Authorization Period Expiration: 9-11-21  Plan of Care Expiration: 10-29-20  Visit # / Visits authorized: 4 / 12  MD Follow up appointment: 11-6-20    Time In: 9:20  Time Out: 10:03  Total Billable Time: 43 minutes    Precautions: Standard    Subjective     Pt reports: she had a setback yesterday. She did her exercises and then the right side of her back began hurting, radiating into the right buttock  Response to previous treatment: no soreness following treatment  Functional change: sharp pain with walking    Pain: 2/10 to start  At end of session 0-1/10  Location: bilateral LB and L leg     Objective   [place] L pelvis persists    TREATMENT    Portia received therapeutic exercises to develop strength, endurance, ROM, flexibility and core stabilization for 24 minutes including:  Patient states she forgets about rolling onto her side prior to getting out of bed    HS stretch (manual)  Piriformis stretch (manual)  SKTC R x10 AAROM with PT  PROM IR/ER R hip to patient tolerance 2x5 ea in prone with blocking of sacrum on right  Bridge x 20 (held 2* increased pain)  Pelvic tilt x 15 (tactile cues required)  Clam sidelying x 15  LTR x 10 to left only, initially patient c/o "pulling" on right which resolved after 2-3 reps  SLR x 10 each  Push/pull x 3, cuing for 25% effort at start of treatment. Pt declined at end of session    Portia received the following manual therapy techniques: Joint mobilizations and Myofacial release were " applied to the: Lsp for 18 minutes, including:  MET to correct L post rotation, yet no change following   STM R Lsp ps and gluteals  Gr II-III RPA L 3-5  Gr II-III RPA sacrum        Home Exercises Provided and Patient Education Provided     Education provided:   - HEP  - Instructed pt in increased awareness of symptoms.  Instructed pt in push/pull at onset of increased symptoms.      Written Home Exercises Provided: yes - given images of proper technique for log roll transfers supine<->sitting  Exercises were reviewed and Portia was able to demonstrate them prior to the end of the session.  Portia demonstrated good  understanding of the education provided.     See EMR under Patient Instructions for exercises provided at initial eval and 10/1/2020.    Assessment   Patient with no complaints left sided pain this date, but limited by right sided low back and sacral pain. Patient with decreased symptoms following manual and able to complete exercises with exception of bridges without increased pain. However upon standing patient apprehensive to movement and tentative movements. Patient reported her back felt better than it did when she came in and she denied radiating pain into buttocks following treatment, but continued to ambulate with tentative/guarded movements and decreased stance time on RLE.     Portia is progressing well towards her goals.   Pt prognosis is Good.     Pt will continue to benefit from skilled outpatient physical therapy to address the deficits listed in the problem list box on initial evaluation, provide pt/family education and to maximize pt's level of independence in the home and community environment.     Pt's spiritual, cultural and educational needs considered and pt agreeable to plan of care and goals.     Anticipated barriers to physical therapy: none    GOALS:   Short Term Goals:  2 weeks  Increase range of motion 25%  Increase strength 1/2 muscle grade  Improve postural awareness of pelvis to  independently identify dysfunction with min assist from PT  Be able to perform HEP with minimal cueing required     Long Term Goals: 30 days  Increase range of motion to 75% to 100% full   Improve muscle strength 1 muscle grade  Improve and stabilize proper pelvic positioning  Restore ability to ambulate with normal pain free gait  Walking for ADL and exercise will be restored without increased pain  Restore ability to stand for ADL without increased pain  Restore ability to lift and carry items such as groceries without increased pain  Restore ability to perform ADL's and household activities independently and without increased pain       Plan   Continue with established plan of care towards PT goals.      Progress with strength and stab as tolerated.      Itzel Winchester, PT

## 2020-10-14 DIAGNOSIS — M54.50 ACUTE LEFT-SIDED LOW BACK PAIN WITHOUT SCIATICA: ICD-10-CM

## 2020-10-14 RX ORDER — METHOCARBAMOL 750 MG/1
750 TABLET, FILM COATED ORAL 2 TIMES DAILY PRN
Qty: 30 TABLET | Refills: 0 | Status: SHIPPED | OUTPATIENT
Start: 2020-10-14 | End: 2020-11-12 | Stop reason: SDUPTHER

## 2020-10-14 NOTE — TELEPHONE ENCOUNTER
----- Message from Cony Mc sent at 10/14/2020 10:56 AM CDT -----  Regarding: asking to speak to Bri  Contact: patient  Type: Needs Medical Advice  Who Called:  patient  Symptoms (please be specific):  na  How long has patient had these symptoms:  aleksey  Pharmacy name and phone #:  [facility]  Best Call Back Number: [phone]  Additional Information: Patient states she would like to speak with Bri the nurse and gives no other info.  Thanks!

## 2020-10-14 NOTE — TELEPHONE ENCOUNTER
I have reviewed her chart -   She is going to PT.  At her last visit she was taking robaxin and ibuprofen.  Is she asking for refill of these medications?  Robaxin is a muscle relaxant and could help her.  She should still continue with PT.

## 2020-10-14 NOTE — TELEPHONE ENCOUNTER
"JasonI spoke with Portia and she said the day before yesterday she started having severe pain in her right side. Physical therapy is working with her, but she is asking if you would give her something for pain because "it hurts so bad." Please advise.  "

## 2020-10-14 NOTE — TELEPHONE ENCOUNTER
Alysha-I spoke with Portia and she said she would like a refill on the Robaxin. She is taking Aleve one twice a day.

## 2020-10-15 ENCOUNTER — CLINICAL SUPPORT (OUTPATIENT)
Dept: REHABILITATION | Facility: HOSPITAL | Age: 75
End: 2020-10-15
Payer: COMMERCIAL

## 2020-10-15 DIAGNOSIS — M54.42 CHRONIC BILATERAL LOW BACK PAIN WITH LEFT-SIDED SCIATICA: ICD-10-CM

## 2020-10-15 DIAGNOSIS — G89.29 CHRONIC BILATERAL LOW BACK PAIN WITH LEFT-SIDED SCIATICA: ICD-10-CM

## 2020-10-15 DIAGNOSIS — M62.81 MUSCLE WEAKNESS: ICD-10-CM

## 2020-10-15 PROCEDURE — 97140 MANUAL THERAPY 1/> REGIONS: CPT | Mod: PN

## 2020-10-15 PROCEDURE — 97110 THERAPEUTIC EXERCISES: CPT | Mod: PN

## 2020-10-20 ENCOUNTER — CLINICAL SUPPORT (OUTPATIENT)
Dept: REHABILITATION | Facility: HOSPITAL | Age: 75
End: 2020-10-20
Payer: COMMERCIAL

## 2020-10-20 DIAGNOSIS — M54.42 CHRONIC BILATERAL LOW BACK PAIN WITH LEFT-SIDED SCIATICA: Primary | ICD-10-CM

## 2020-10-20 DIAGNOSIS — M62.81 MUSCLE WEAKNESS: ICD-10-CM

## 2020-10-20 DIAGNOSIS — G89.29 CHRONIC BILATERAL LOW BACK PAIN WITH LEFT-SIDED SCIATICA: Primary | ICD-10-CM

## 2020-10-20 PROCEDURE — 97140 MANUAL THERAPY 1/> REGIONS: CPT | Mod: PN | Performed by: PHYSICAL THERAPIST

## 2020-10-20 PROCEDURE — 97035 APP MDLTY 1+ULTRASOUND EA 15: CPT | Mod: PN | Performed by: PHYSICAL THERAPIST

## 2020-10-20 PROCEDURE — 97110 THERAPEUTIC EXERCISES: CPT | Mod: PN | Performed by: PHYSICAL THERAPIST

## 2020-10-20 NOTE — PROGRESS NOTES
Physical Therapy Daily Treatment Note     Name: Lilli Foote [place]  Clinic Number: [id number]    Therapy Diagnosis:   Encounter Diagnoses   Name Primary?    Chronic bilateral low back pain with left-sided sciatica Yes    Muscle weakness      Physician: Alysha Vang PA-C    Visit Date: 10/20/2020    Physician Orders: PT Eval and Treat 2 x week 30 days  Medical Diagnosis from Referral: M54.42 (ICD-10-CM) - Acute left-sided low back pain with left-sided sciatica  Evaluation Date: 9/29/2020  Authorization Period Expiration: 9-11-21  Plan of Care Expiration: 10-29-20  Visit # / Visits authorized: 6 / 12  MD Follow up appointment: 11-6-20    Time In: 8:35  Time Out: 9:27  Total Billable Time: 52 minutes     Precautions: Standard    Subjective     Pt reports: she has been having more severe pain sean on right side. Pt states after session pain relief does not last   Pt states no longer in L leg feeling more now feeling more on R   Response to previous treatment: no soreness following treatment  Functional change: increased pain with bed mobility and ambulation.     Pain: 8/10 to start  At end of session 6/10  Location: bilateral LB and R leg     Objective   [place] L pelvis persists    TREATMENT    Portia received therapeutic exercises to develop strength, endurance, ROM, flexibility and core stabilization for 32 minutes including:   Pt with pelvic dysfunction and did not correct with push/pull.  Performed contract-relax to piriformis followed by push/pull which corrected dysfunction.  Pt was able to note a positive change in symptoms. Written instructions were provided to patient to be able to perform at home when push/pull alone does not work.      Instructed pt to only perform stretches and push/pull using contract relax as needed and to keep all ex in pain free zone      Hold all exercises below today  HS stretch (manual)  Piriformis stretch (manual)  SKTC R x10 AAROM with PT  PROM IR/ER R hip to patient tolerance  2x5 ea in prone with blocking of sacrum on right  Bridge x 20 (held 2* increased pain)  Pelvic tilt x 15 (held 2* increased pain)  Clam supine x 15 (to minimize transfers)  LTR x 10 to left only  SLR x 10 each  Push/pull x 3, cuing for 25% effort at start of treatment at start of treatment and end of treatment  Pelvic shotgun, x 1    Portia received the following manual therapy techniques: Joint mobilizations and Myofacial release were applied to the: R gluteals/piriformis for 10 minutes, including:  STM focused on gluteals and piriformis    Portia received the following direct contact modalities after being cleared for contraindications: Ultrasound:  Lilli received ultrasound to manage pain and inflammation at 100 % duty cycle applied to the R piriformis 1 Mhz at an intensity of 1.5  W/cm2  for a duration of 8 minutes. Patient tolerated treatment well without adverse effects. Therapist was in attendance throughout intervention.  Pt reports relief with US.     Home Exercises Provided and Patient Education Provided     Education provided:   - HEP  - Instructed pt in increased awareness of symptoms.  Instructed pt in push/pull at onset of increased symptoms.      Written Home Exercises Provided: yes - given images of proper technique for log roll transfers supine<->sitting  Exercises were reviewed and Portia was able to demonstrate them prior to the end of the session.  Portia demonstrated good  understanding of the education provided.     See EMR under Patient Instructions for exercises provided at initial eval and 10/1/2020.    Assessment   Patient was unsuccessful with pelvic stabilization ex and after using MET was able to level pelvis and felt improvement in symptoms.  Pt understands to rest today and to only stretch gently and continue with push/pull techniques q 2 hours to help maintain pelvic stability and decrease inflammation    Portia is progressing well towards her goals.   Pt prognosis is Good.     Pt will  continue to benefit from skilled outpatient physical therapy to address the deficits listed in the problem list box on initial evaluation, provide pt/family education and to maximize pt's level of independence in the home and community environment.     Pt's spiritual, cultural and educational needs considered and pt agreeable to plan of care and goals.     Anticipated barriers to physical therapy: none    GOALS:   Short Term Goals:  2 weeks  Increase range of motion 25%  Increase strength 1/2 muscle grade  Improve postural awareness of pelvis to independently identify dysfunction with min assist from PT  Be able to perform HEP with minimal cueing required     Long Term Goals: 30 days  Increase range of motion to 75% to 100% full   Improve muscle strength 1 muscle grade  Improve and stabilize proper pelvic positioning  Restore ability to ambulate with normal pain free gait  Walking for ADL and exercise will be restored without increased pain  Restore ability to stand for ADL without increased pain  Restore ability to lift and carry items such as groceries without increased pain  Restore ability to perform ADL's and household activities independently and without increased pain       Plan   Continue with established plan of care towards PT goals.      Progress with strength and stab as tolerated.      Corine Andre, PT

## 2020-10-22 ENCOUNTER — CLINICAL SUPPORT (OUTPATIENT)
Dept: REHABILITATION | Facility: HOSPITAL | Age: 75
End: 2020-10-22
Payer: COMMERCIAL

## 2020-10-22 DIAGNOSIS — M62.81 MUSCLE WEAKNESS: ICD-10-CM

## 2020-10-22 DIAGNOSIS — M54.42 CHRONIC BILATERAL LOW BACK PAIN WITH LEFT-SIDED SCIATICA: ICD-10-CM

## 2020-10-22 DIAGNOSIS — G89.29 CHRONIC BILATERAL LOW BACK PAIN WITH LEFT-SIDED SCIATICA: ICD-10-CM

## 2020-10-22 PROCEDURE — 97140 MANUAL THERAPY 1/> REGIONS: CPT | Mod: PN | Performed by: PHYSICAL THERAPIST

## 2020-10-22 PROCEDURE — 97035 APP MDLTY 1+ULTRASOUND EA 15: CPT | Mod: PN | Performed by: PHYSICAL THERAPIST

## 2020-10-22 PROCEDURE — 97110 THERAPEUTIC EXERCISES: CPT | Mod: PN | Performed by: PHYSICAL THERAPIST

## 2020-10-22 NOTE — PROGRESS NOTES
Physical Therapy Daily Treatment Note     Name: Lilli Foote [place]  Clinic Number: [id number]    Therapy Diagnosis:   Encounter Diagnoses   Name Primary?    Chronic bilateral low back pain with left-sided sciatica     Muscle weakness      Physician: Alysha Vang PA-C    Visit Date: 10/22/2020    Physician Orders: PT Eval and Treat 2 x week 30 days  Medical Diagnosis from Referral: M54.42 (ICD-10-CM) - Acute left-sided low back pain with left-sided sciatica  Evaluation Date: 9/29/2020  Authorization Period Expiration: 9-11-21  Plan of Care Expiration: 10-29-20  Visit # / Visits authorized: 7 / 12  MD Follow up appointment: 11-6-20    Time In:10:15  Time Out: 11:04  Total Billable Time: 49 minutes     Precautions: Standard    Subjective     Pt reports: been doing stretches and push/pull, heat and ice and rest feeling better.  Pt feels US really helps  Response to previous treatment: no soreness following treatment  Functional change: increased pain with bed mobility and ambulation.     Pain: 5/10 to start  At end of session 3.5  Location: bilateral LB and R leg     Objective   HI L pelvis persists    TREATMENT    Portia received therapeutic exercises to develop strength, endurance, ROM, flexibility and core stabilization for 29 minutes including:   Pt required much verbal and tactile cueing for proper performance of push/pull, but did level pelvis after ex    HSS supine x 5  Piriformis stretch x 10  Bridge x 10  Pelvic tilt x 10  Modified push/pull x 3    Attempted SLR Led to pain so will hold there for now       Hold all exercises below today  HS stretch (manual)  Piriformis stretch (manual)  SKTC R x10 AAROM with PT  PROM IR/ER R hip to patient tolerance 2x5 ea in prone with blocking of sacrum on right  Bridge x 20 (held 2* increased pain)  Pelvic tilt x 15 (held 2* increased pain)  Clam supine x 15 (to minimize transfers)  LTR x 10 to left only  SLR x 10 each  Push/pull x 3, cuing for 25% effort at start  of treatment at start of treatment and end of treatment  Pelvic shotgun, x 1    Portia received the following manual therapy techniques: Dry needling were applied to the: R gluteals/piriformis for 10 minutes, including:  Dry needling consent form was reviewed with the patient addressing all questions and concerns and signed by patient.  Patient also gave verbal consent to undergo dry needling.  Copy of the consent form was provided to patient as requested by patient. Dry needling with trigger point/manual therapy techniques was performed to R piriformis.  All needles were removed and changes in signs and symptoms were decreased to 3.5/10.  Dry needling was performed to decrease inflammation, increase circulation, decrease pain and restore homeostasis.      Portia received the following direct contact modalities after being cleared for contraindications: Ultrasound:  Lilli received ultrasound to manage pain and inflammation at 100 % duty cycle applied to the R piriformis 1 Mhz at an intensity of 1.5  W/cm2  for a duration of 8 minutes. Patient tolerated treatment well without adverse effects. Therapist was in attendance throughout intervention.  Pt reports relief with US.     Home Exercises Provided and Patient Education Provided     Education provided:   - HEP  - Instructed pt in increased awareness of symptoms.  Instructed pt in push/pull at onset of increased symptoms.    - Dry needling and effects    Written Home Exercises Provided: yes - reprinted exactly what I expect pt to do  Exercises were reviewed and Portia was able to demonstrate them prior to the end of the session.  Portia demonstrated good  understanding of the education provided.     See EMR under Patient Instructions for exercises provided at initial eval and 10/1/2020, 10-22-20.    Assessment   Patient with less pain overall and able to get pain down to 3.5/10 after DN.  Pt was not performing push/pull correctly, but with verbal and tactile cueing able  to self mobilize with push/pull only and no need for MET technique.  Able to progress some strengthening today and appears to better understand push/pull at onset of increased symptoms.     Portia is progressing well towards her goals.   Pt prognosis is Good.     Pt will continue to benefit from skilled outpatient physical therapy to address the deficits listed in the problem list box on initial evaluation, provide pt/family education and to maximize pt's level of independence in the home and community environment.     Pt's spiritual, cultural and educational needs considered and pt agreeable to plan of care and goals.     Anticipated barriers to physical therapy: none    GOALS:   Short Term Goals:  2 weeks  Increase range of motion 25%  Increase strength 1/2 muscle grade  Improve postural awareness of pelvis to independently identify dysfunction with min assist from PT  Be able to perform HEP with minimal cueing required     Long Term Goals: 30 days  Increase range of motion to 75% to 100% full   Improve muscle strength 1 muscle grade  Improve and stabilize proper pelvic positioning  Restore ability to ambulate with normal pain free gait  Walking for ADL and exercise will be restored without increased pain  Restore ability to stand for ADL without increased pain  Restore ability to lift and carry items such as groceries without increased pain  Restore ability to perform ADL's and household activities independently and without increased pain       Plan   Continue with established plan of care towards PT goals.      Progress with strength and stab as tolerated.      Corine Andre, PT

## 2020-10-23 ENCOUNTER — OFFICE VISIT (OUTPATIENT)
Dept: PAIN MEDICINE | Facility: CLINIC | Age: 75
End: 2020-10-23
Payer: COMMERCIAL

## 2020-10-23 VITALS
DIASTOLIC BLOOD PRESSURE: 75 MMHG | SYSTOLIC BLOOD PRESSURE: 165 MMHG | HEIGHT: 65 IN | TEMPERATURE: 98 F | HEART RATE: 76 BPM | BODY MASS INDEX: 19.28 KG/M2 | OXYGEN SATURATION: 100 % | WEIGHT: 115.75 LBS

## 2020-10-23 DIAGNOSIS — M47.816 LUMBAR SPONDYLOSIS: ICD-10-CM

## 2020-10-23 DIAGNOSIS — M46.1 SACROILIITIS: Primary | ICD-10-CM

## 2020-10-23 PROCEDURE — 99499 UNLISTED E&M SERVICE: CPT | Mod: S$GLB,,, | Performed by: ANESTHESIOLOGY

## 2020-10-23 PROCEDURE — 99999 PR PBB SHADOW E&M-EST. PATIENT-LVL III: ICD-10-PCS | Mod: PBBFAC,,, | Performed by: ANESTHESIOLOGY

## 2020-10-23 PROCEDURE — 99204 PR OFFICE/OUTPT VISIT, NEW, LEVL IV, 45-59 MIN: ICD-10-PCS | Mod: S$GLB,,, | Performed by: ANESTHESIOLOGY

## 2020-10-23 PROCEDURE — 99204 OFFICE O/P NEW MOD 45 MIN: CPT | Mod: S$GLB,,, | Performed by: ANESTHESIOLOGY

## 2020-10-23 PROCEDURE — 99499 RISK ADDL DX/OHS AUDIT: ICD-10-PCS | Mod: S$GLB,,, | Performed by: ANESTHESIOLOGY

## 2020-10-23 PROCEDURE — 99999 PR PBB SHADOW E&M-EST. PATIENT-LVL III: CPT | Mod: PBBFAC,,, | Performed by: ANESTHESIOLOGY

## 2020-10-23 NOTE — PROGRESS NOTES
[facility] New Patient Evaluation    Referred by: Alysha Vang PA-C  Reason for referral: back pain    CC:   Chief Complaint   Patient presents with    Hip Pain     both      Last 3 PDI Scores 10/23/2020   Pain Disability Index (PDI) 23       HPI:   Lilli Stringer is a 74 y.o. female who complains of back pain    Onset: the beginning of August, she doesn't remember the exact date.  Inciting Event: She was [place] some items [place] [employer] when she developed pain in the back  Progression: since onset, pain is stable  Current Pain Score: 6/10  Typical Range: 1-8/10  Timing: constant  Quality: sharp  Radiation: yes, down into the right buttock  Associated numbness or weakness: no numbness, yesweakness   Exacerbated by: standing, walking, back flexion  Allievated by: rest, sitting, laying down  Is Pain Level Acceptable?: No    Previous Therapies:  PT/OT: yes  HEP:   Interventions:   Surgery:  Medications:   - NSAIDS: aleve  - MSK Relaxants: robaxin  - TCAs:   - SNRIs:   - Topicals:   - Anticonvulsants:  - Opioids:     History:    Current Outpatient Medications:     fluticasone (FLONASE) 50 mcg/actuation nasal spray, APPLY TWO SPRAYS NASALLY ONCE TO TWICE DAILY DEPENDING ON SEVERITY OFSYMPTOMS (Patient taking differently: as needed. ), Disp: 1 Bottle, Rfl: 0    methocarbamoL (ROBAXIN) 750 MG Tab, Take 1 tablet (750 mg total) by mouth 2 (two) times daily as needed (muscle spasms/ pain)., Disp: 30 tablet, Rfl: 0    naproxen sodium (ANAPROX) 220 MG tablet, Take 220 mg by mouth every evening., Disp: , Rfl:     predniSONE (DELTASONE) 10 MG tablet, Take 40mg for 2days, take 30mg for 2 days, take 20mg for 2 days, take 10mg for 2 days, Disp: 20 tablet, Rfl: 0    Past Medical History:   Diagnosis Date    Allergy     Endometriosis of uterus        Past Surgical History:   Procedure Laterality Date    BREAST SURGERY  1980s    DILATION AND CURETTAGE OF UTERUS      TUBAL LIGATION          Family History   Problem Relation Age of Onset    Alzheimer's disease Mother     Cancer Mother     Breast cancer Neg Hx     Ovarian cancer Neg Hx        Social History     Socioeconomic History    Marital status:      Spouse name: Not on file    Number of children: 1    Years of education: Not on file    Highest education level: Not on file   Occupational History    Occupation: [employer]   Social Needs    Financial resource strain: Not on file    Food insecurity     Worry: Not on file     Inability: Not on file    Transportation needs     Medical: Not on file     Non-medical: Not on file   Tobacco Use    Smoking status: Current Some Day Smoker     Packs/day: 0.25     Types: Cigarettes    Smokeless tobacco: Never Used   Substance and Sexual Activity    Alcohol use: Yes     Comment: social    Drug use: No    Sexual activity: Yes     Partners: Male     Birth control/protection: None, Surgical   Lifestyle    Physical activity     Days per week: Not on file     Minutes per session: Not on file    Stress: Not on file   Relationships    Social connections     Talks on phone: Not on file     Gets together: Not on file     Attends Latter-day service: Not on file     Active member of club or organization: Not on file     Attends meetings of clubs or organizations: Not on file     Relationship status: Not on file   Other Topics Concern    Not on file   Social History Narrative    Not on file       Review of patient's allergies indicates:   Allergen Reactions    Iodine and iodide containing products     Sulfa (sulfonamide antibiotics)        Review of Systems:  General ROS: negative for - fever  Psychological ROS: negative for - hostility  Hematological and Lymphatic ROS: negative for - bleeding problems  Endocrine ROS: negative for - unexpected weight changes  Respiratory ROS: no cough, shortness of breath, or wheezing  Cardiovascular ROS: no chest pain or dyspnea on  "exertion  Gastrointestinal ROS: no abdominal pain, change in bowel habits, or black or bloody stools  Musculoskeletal ROS: positive for - muscular weakness  Neurological ROS: negative for - bowel and bladder control changes or numbness/tingling  Dermatological ROS: negative for rash    Physical Exam:  Vitals:    10/23/20 1443   BP: (!) 165/75   Pulse: 76   Temp: 97.6 °F (36.4 °C)   TempSrc: Temporal   SpO2: 100%   Weight: 52.5 kg (115 lb 11.9 oz)   Height: 5' 5" (1.651 m)   PainSc:   6   PainLoc: Hip     Body mass index is 19.26 kg/m².     Gen: NAD  Gait: gait intact  Psych:  Mood appropriate for given condition  HEENT: eyes anicteric   GI: Abd soft  CV: RRR  Lungs: breathing unlabored   ROM: limited AROM of the L spine in all planes, full ROM at ankles, knees and hips  Lumbar flexion 90 degrees, extension 50 degrees, side bending 30 degrees.    Sensation: intact to light touch in all dermatomes tested from L2-S1 bilaterally  Reflexes: 2+ b/l patella, biceps, triceps, and 0/0 b/l achilles sarabia's negative  Palpation: Diffusely tender over lumbar paraspinals  +TTP over the right SI joint  Tone: normal in the b/l knees and hips   Skin: intact  Extremities: No edema in b/l ankles or hands  Provacative tests: - SLR, + RIA on the right, mild axial lumbar facet loading    Motor: Giveaway throughout the exam     Right Left   L2/3 Iliacus Hip flexion  5   5    L3/4 Qudratus Femoris Knee Extension  5  5   L4/5 Tib Anterior Ankle Dorsiflexion   5  5   L5/S1 Extensor Hallicus Longus Great toe extension  5  5                 S1/S2 Gastroc/Soleus Plantar Flexion  5  5       Imaging:  MRI lumbar spine 9/24/20  Independently reviewed: no significant central canal narrowing at any level, L3-4 b/l facet arthropathy, L4-5 disc bulge with narrowing of the left lateral recess and b/l facet arthropathy with facet joint effusions, L5-S1 facet arthropathy without significant central or foraminal narrowing.    Xray lumbar spine " 8/13/20  FINDINGS:  Alignment is normal.  There is mild DJD.  No fracture dislocation bone destruction seen.  No trauma seen.    Labs:  BMP  Lab Results   Component Value Date     06/28/2017    K 4.6 06/28/2017     06/28/2017    CO2 24 06/28/2017    BUN 12 06/28/2017    CREATININE 0.8 06/28/2017    CALCIUM 9.6 06/28/2017    ANIONGAP 12 06/28/2017    ESTGFRAFRICA >60.0 06/28/2017    EGFRNONAA >60.0 06/28/2017     Lab Results   Component Value Date    ALT 19 06/28/2017    AST 28 06/28/2017    ALKPHOS 108 06/28/2017    BILITOT 0.6 06/28/2017       Assessment:   Problem List Items Addressed This Visit     None      Visit Diagnoses     Sacroiliitis    -  Primary    Lumbar spondylosis            74 y.o. year old female presents to the office with back pain.  She says the pain started at the beginning of august but doesn't know the exact day.  The pain started while at work, she was moving some items around [facility] when she developed pain in the back.   Initially she says the pain was on the left side, but with PT and home exercises the left sided pain improved.  Today her pain id 6/10, constant, sharp, in the right buttock area. She denies any radicular pain or numbness but reports weakness in her right leg.  On exam she appears to have full strength but she has giveaway throughout the exam.  2+ b/l biceps, triceps, patellar dtrs, 0/0 b/l achilles.  He sensation is intact to light touch b/l L2-S1.  She has tenderness over the right SI joints with + RIA on the right.  She only has mild lumbar axial facet loading and it doesn't appear to reproduce her typical pain today.   I independently reviewed her lumbar MRI, she has no significant central canal narrowing at any level, L3-4 b/l facet arthropathy, L4-5 disc bulge with narrowing of the left lateral recess and b/l facet arthropathy with facet joint effusions, L5-S1 facet arthropathy without significant central or foraminal narrowing.  I think her pain is  coming from her right SI joint.  She may have some overlap of pain from her facet arthropathy as well, but I think the SI is the primary pain generator at this time.  I offered her a right SI joint injection but she defers for now.  At this time she can continue conservative care and follow up as needed.    Treatment Plan:   Procedures: offered right SI joint injection, she defers for now  PT/OT/HEP: continue physical therapy and home exercises  Medications: continue medications as prescribed  Labs: Reviewed and medications are appropriately dosed for current hepatorenal function.  Imaging: No additional recommended at this time    : Not applicable    Jagdeep Cade M.D.  Interventional Pain Medicine / Anesthesiology

## 2020-10-27 ENCOUNTER — CLINICAL SUPPORT (OUTPATIENT)
Dept: REHABILITATION | Facility: HOSPITAL | Age: 75
End: 2020-10-27
Payer: COMMERCIAL

## 2020-10-27 DIAGNOSIS — M62.81 MUSCLE WEAKNESS: ICD-10-CM

## 2020-10-27 DIAGNOSIS — M54.42 CHRONIC BILATERAL LOW BACK PAIN WITH LEFT-SIDED SCIATICA: ICD-10-CM

## 2020-10-27 DIAGNOSIS — G89.29 CHRONIC BILATERAL LOW BACK PAIN WITH LEFT-SIDED SCIATICA: ICD-10-CM

## 2020-10-27 PROCEDURE — 97110 THERAPEUTIC EXERCISES: CPT | Mod: PN | Performed by: PHYSICAL THERAPIST

## 2020-10-27 PROCEDURE — 97035 APP MDLTY 1+ULTRASOUND EA 15: CPT | Mod: PN | Performed by: PHYSICAL THERAPIST

## 2020-10-27 PROCEDURE — 97116 GAIT TRAINING THERAPY: CPT | Mod: PN | Performed by: PHYSICAL THERAPIST

## 2020-10-27 NOTE — PLAN OF CARE
"    Physical Therapy Progress and Daily Treatment Note     Name: Lilli Foote [place]  Clinic Number: [id number]    Therapy Diagnosis:   Encounter Diagnoses   Name Primary?    Chronic bilateral low back pain with left-sided sciatica     Muscle weakness      Physician: Alysha Vang PA-C    Visit Date: 10/27/2020    Physician Orders: PT Eval and Treat 2 x week 30 days  Medical Diagnosis from Referral: M54.42 (ICD-10-CM) - Acute left-sided low back pain with left-sided sciatica  Evaluation Date: 9/29/2020  Authorization Period Expiration: 9-11-21  Plan of Care Expiration: 10-29-20  Visit # / Visits authorized: 8 / 12  MD Follow up appointment: 11-6-20    Time In:8:30  Time Out: 9:40  Total Billable Time: 55 minutes     Precautions: Standard    Subjective     Pt reports: she feels US helped and exercises that we redid helped.  Pt states she saw Dr. Cade and wants to talk to Ms Vang before she makes a decision about the shot  Pt reports pain is now more R LE with no pain in L  Response to previous treatment: did ok  Functional change: walking a little better      Pain: 4/10 to start  At worst sharp pain is 5/10 at best After exercise standing 1/10  At end of session 3  Initial eval: Current 4/10, worst 9/10, best 4/10 Pt feels she was higher level pain than she indicated at IE, but feels numbers now are more accurate and that she has improved   Location: bilateral LB and R leg     Objective       Lumbar active range of motion in standing is: 10-27-20  - flexion - mid calf                     - extension -  50%                         - left side bending -  To knee        - right side bending -  3" above knee           Lumbar active range of motion in standing is: initial eval  - flexion - mid thigh                     - extension -  25%                         - left side bending -  Mid thigh         - right side bending -  Mid thigh   Pain all planes           Pelvic positioning: continue with dysfunction but " improving in self mob techniques AR R      Flexibility testing:  - hamstrings:     90/90 test R 30 L 25 at IE  R 20 L 30           - gastrocnemius:   DF ankle R 5 degrees L 5 degrees    Muscle Strength 11-27-20 Pt c/o increased pain on R side with active movement in hip  MMT R L   Hip flexion 3/5 3+/5   Hip abduction 3/5 3/5   Hip extension 3/5 3/5   Glut max 3/5 3-/5        Knee extension 5/5 5/5   Knee flexion 5/5 5/5        Muscle Strength Pt with acute pain with active motion of L LE so deferred further MMT  Initial eval  MMT R L   Hip flexion 3+/5 3/5   Knee extension 5/5 5/5   Knee flexion 5/5 5/5         Palpation: no significant TTP     Joint mobility: mod decreased spring at IE  Not tested       Functional assessment:   - walking: slow and cautious very slight antalgia at IE  antalgic L  - sit to stand: min difficulty  - sit to supine: min difficulty       - supine to sit: min difficulty at IE mod difficulty, had to work with pt to log roll to transfer   - supine to prone: min difficulty at IE not tested     TREATMENT    Portia received therapeutic exercises to develop strength, endurance, ROM, flexibility and core stabilization for 35 minutes including:     HSS supine x 5  Piriformis stretch x 10  Bridge x 10, required cueing for proper performance and recruitment   Pelvic tilt x 2/10   Quad set x 10   Clam sidelying x 10  Modified push/pull x 3    Attempted SLR Led to pain so performed quad set      Gait training with straight cane to unload R LE due to continued c/o pain with WB on R to decrease pain and improve safety with gait.  Pt had difficulty coordinating movement of cane to R LE and with stride length B but with practice able to improve gait and then when finished work in clinic pt reported not as much pain with ambulation even without cane.  Gait training performed for 10 min.      (NP)Portia received the following manual therapy techniques: Dry needling were applied to the: R gluteals/piriformis for  0 minutes, including:    Patient  gave verbal consent to undergo dry needling. Dry needling with trigger point/manual therapy techniques was performed to R piriformis.  All needles were removed and changes in signs and symptoms were noted.  Dry needling was performed to decrease inflammation, increase circulation, decrease pain and restore homeostasis.      Portia received the following direct contact modalities after being cleared for contraindications: Ultrasound:  Lilli received ultrasound to manage pain and inflammation at 100 % duty cycle applied to the R piriformis 1 Mhz at an intensity of 1.5  W/cm2  for a duration of 8 minutes. Patient tolerated treatment well without adverse effects.   Pt reports relief with US.     Home Exercises Provided and Patient Education Provided     Education provided:   - HEP  - Instructed pt further in increased awareness of symptoms.  Instructed pt in push/pull at onset of increased symptoms.    -     Written Home Exercises Provided: yes - reprinted exactly what I expect pt to do  Exercises were reviewed and Portia was able to demonstrate them prior to the end of the session.  Portia demonstrated good  understanding of the education provided.     See EMR under Patient Instructions for exercises provided at initial eval and 10/1/2020, 10-22-20.    Assessment   Patient demonstrates some improvement in range of motion, strength, stabilization and function.  Pt does not seem Ms. Vang until end of next week and would benefit from continued treatment until that appt. Pt with improved gait and decreased pain after treatment and may have developed decreased pain with WB after getting pt to not be as apprehensive about ambulation and relaxed to allow more normal gait with less increase in pain.   Pt ar treatment well and will benefit from continued treatment to assist pt in fully achieving her goals       Portia is progressing well towards her goals.   Pt prognosis is Good.     Pt will  continue to benefit from skilled outpatient physical therapy to address the deficits listed in the problem list box on initial evaluation, provide pt/family education and to maximize pt's level of independence in the home and community environment.     Pt's spiritual, cultural and educational needs considered and pt agreeable to plan of care and goals.     Anticipated barriers to physical therapy: none    GOALS:   Short Term Goals:  2 weeks   Increase range of motion 25% MET  Increase strength 1/2 muscle grade Progressing, not met  Improve postural awareness of pelvis to independently identify dysfunction with min assist from PT MET  Be able to perform HEP with minimal cueing required Progressing, not met     Long Term Goals: 30 days Progressing, not met LTG's  Increase range of motion to 75% to 100% full   Improve muscle strength 1 muscle grade  Improve and stabilize proper pelvic positioning  Restore ability to ambulate with normal pain free gait  Walking for ADL and exercise will be restored without increased pain  Restore ability to stand for ADL without increased pain  Restore ability to lift and carry items such as groceries without increased pain  Restore ability to perform ADL's and household activities independently and without increased pain       Plan   If you concur, I recommend patient continue with physical therapy 2 times a week  for 1 additional week until  next physician appt on 11-6-20.  Please advise us of your  recommendations. Thank you for allowing us to assist in the care of your patient.      Corine Andre, MS, PT          I certify the need for these services furnished under this plan of treatment and while under my care.    ____________________________________ Physician/Referring Practitioner                                Date of Signature             Corine Andre, PT

## 2020-10-27 NOTE — PROGRESS NOTES
"    Physical Therapy Progress and Daily Treatment Note     Name: Lilli Foote [place]  Clinic Number: [id number]    Therapy Diagnosis:   Encounter Diagnoses   Name Primary?    Chronic bilateral low back pain with left-sided sciatica     Muscle weakness      Physician: Alysha Vang PA-C    Visit Date: 10/27/2020    Physician Orders: PT Eval and Treat 2 x week 30 days  Medical Diagnosis from Referral: M54.42 (ICD-10-CM) - Acute left-sided low back pain with left-sided sciatica  Evaluation Date: 9/29/2020  Authorization Period Expiration: 9-11-21  Plan of Care Expiration: 10-29-20  Visit # / Visits authorized: 8 / 12  MD Follow up appointment: 11-6-20    Time In:8:30  Time Out: 9:40  Total Billable Time: 55 minutes     Precautions: Standard    Subjective     Pt reports: she feels US helped and exercises that we redid helped.  Pt states she saw Dr. Cade and wants to talk to Ms Vang before she makes a decision about the shot  Pt reports pain is now more R LE with no pain in L  Response to previous treatment: did ok  Functional change: walking a little better      Pain: 4/10 to start  At worst sharp pain is 5/10 at best After exercise standing 1/10  At end of session 3  Initial eval: Current 4/10, worst 9/10, best 4/10 Pt feels she was higher level pain than she indicated at IE, but feels numbers now are more accurate and that she has improved   Location: bilateral LB and R leg     Objective       Lumbar active range of motion in standing is: 10-27-20  - flexion - mid calf                     - extension -  50%                         - left side bending -  To knee        - right side bending -  3" above knee           Lumbar active range of motion in standing is: initial eval  - flexion - mid thigh                     - extension -  25%                         - left side bending -  Mid thigh         - right side bending -  Mid thigh   Pain all planes           Pelvic positioning: continue with dysfunction but " improving in self mob techniques AR R      Flexibility testing:  - hamstrings:     90/90 test R 30 L 25 at IE  R 20 L 30           - gastrocnemius:   DF ankle R 5 degrees L 5 degrees    Muscle Strength 11-27-20 Pt c/o increased pain on R side with active movement in hip  MMT R L   Hip flexion 3/5 3+/5   Hip abduction 3/5 3/5   Hip extension 3/5 3/5   Glut max 3/5 3-/5        Knee extension 5/5 5/5   Knee flexion 5/5 5/5        Muscle Strength Pt with acute pain with active motion of L LE so deferred further MMT  Initial eval  MMT R L   Hip flexion 3+/5 3/5   Knee extension 5/5 5/5   Knee flexion 5/5 5/5         Palpation: no significant TTP     Joint mobility: mod decreased spring at IE  Not tested       Functional assessment:   - walking: slow and cautious very slight antalgia at IE  antalgic L  - sit to stand: min difficulty  - sit to supine: min difficulty       - supine to sit: min difficulty at IE mod difficulty, had to work with pt to log roll to transfer   - supine to prone: min difficulty at IE not tested     TREATMENT    Portia received therapeutic exercises to develop strength, endurance, ROM, flexibility and core stabilization for 35 minutes including:     HSS supine x 5  Piriformis stretch x 10  Bridge x 10, required cueing for proper performance and recruitment   Pelvic tilt x 2/10   Quad set x 10   Clam sidelying x 10  Modified push/pull x 3    Attempted SLR Led to pain so performed quad set      Gait training with straight cane to unload R LE due to continued c/o pain with WB on R to decrease pain and improve safety with gait.  Pt had difficulty coordinating movement of cane to R LE and with stride length B but with practice able to improve gait and then when finished work in clinic pt reported not as much pain with ambulation even without cane.  Gait training performed for 10 min.      (NP)Portia received the following manual therapy techniques: Dry needling were applied to the: R gluteals/piriformis for  0 minutes, including:    Patient  gave verbal consent to undergo dry needling. Dry needling with trigger point/manual therapy techniques was performed to R piriformis.  All needles were removed and changes in signs and symptoms were noted.  Dry needling was performed to decrease inflammation, increase circulation, decrease pain and restore homeostasis.      Portia received the following direct contact modalities after being cleared for contraindications: Ultrasound:  Lilli received ultrasound to manage pain and inflammation at 100 % duty cycle applied to the R piriformis 1 Mhz at an intensity of 1.5  W/cm2  for a duration of 8 minutes. Patient tolerated treatment well without adverse effects.   Pt reports relief with US.     Home Exercises Provided and Patient Education Provided     Education provided:   - HEP  - Instructed pt further in increased awareness of symptoms.  Instructed pt in push/pull at onset of increased symptoms.    -     Written Home Exercises Provided: yes - reprinted exactly what I expect pt to do  Exercises were reviewed and Portia was able to demonstrate them prior to the end of the session.  Portia demonstrated good  understanding of the education provided.     See EMR under Patient Instructions for exercises provided at initial eval and 10/1/2020, 10-22-20.    Assessment   Patient demonstrates some improvement in range of motion, strength, stabilization and function.  Pt does not seem Ms. Vang until end of next week and would benefit from continued treatment until that appt. Pt with improved gait and decreased pain after treatment and may have developed decreased pain with WB after getting pt to not be as apprehensive about ambulation and relaxed to allow more normal gait with less increase in pain.   Pt ar treatment well and will benefit from continued treatment to assist pt in fully achieving her goals       Portia is progressing well towards her goals.   Pt prognosis is Good.     Pt will  continue to benefit from skilled outpatient physical therapy to address the deficits listed in the problem list box on initial evaluation, provide pt/family education and to maximize pt's level of independence in the home and community environment.     Pt's spiritual, cultural and educational needs considered and pt agreeable to plan of care and goals.     Anticipated barriers to physical therapy: none    GOALS:   Short Term Goals:  2 weeks   Increase range of motion 25% MET  Increase strength 1/2 muscle grade Progressing, not met  Improve postural awareness of pelvis to independently identify dysfunction with min assist from PT MET  Be able to perform HEP with minimal cueing required Progressing, not met     Long Term Goals: 30 days Progressing, not met LTG's  Increase range of motion to 75% to 100% full   Improve muscle strength 1 muscle grade  Improve and stabilize proper pelvic positioning  Restore ability to ambulate with normal pain free gait  Walking for ADL and exercise will be restored without increased pain  Restore ability to stand for ADL without increased pain  Restore ability to lift and carry items such as groceries without increased pain  Restore ability to perform ADL's and household activities independently and without increased pain       Plan   If you concur, I recommend patient continue with physical therapy 2 times a week  for 1 additional week until  next physician appt on 11-6-20.  Please advise us of your  recommendations. Thank you for allowing us to assist in the care of your patient.      Corine Andre, MS, PT          I certify the need for these services furnished under this plan of treatment and while under my care.    ____________________________________ Physician/Referring Practitioner                                Date of Signature             Corine Andre, PT

## 2020-11-03 ENCOUNTER — CLINICAL SUPPORT (OUTPATIENT)
Dept: REHABILITATION | Facility: HOSPITAL | Age: 75
End: 2020-11-03
Payer: MEDICARE

## 2020-11-03 DIAGNOSIS — G89.29 CHRONIC BILATERAL LOW BACK PAIN WITH LEFT-SIDED SCIATICA: ICD-10-CM

## 2020-11-03 DIAGNOSIS — M54.42 CHRONIC BILATERAL LOW BACK PAIN WITH LEFT-SIDED SCIATICA: ICD-10-CM

## 2020-11-03 DIAGNOSIS — M62.81 MUSCLE WEAKNESS: ICD-10-CM

## 2020-11-03 PROCEDURE — 97110 THERAPEUTIC EXERCISES: CPT | Mod: PN | Performed by: PHYSICAL THERAPIST

## 2020-11-03 PROCEDURE — 97140 MANUAL THERAPY 1/> REGIONS: CPT | Mod: PN | Performed by: PHYSICAL THERAPIST

## 2020-11-03 NOTE — PROGRESS NOTES
Physical Therapy Daily Treatment Note     Name: Lilli Foote [place]  Clinic Number: [id number]    Therapy Diagnosis:   Encounter Diagnoses   Name Primary?    Chronic bilateral low back pain with left-sided sciatica     Muscle weakness      Physician: Alysha Vang PA-C    Visit Date: 11/3/2020    Physician Orders: PT Eval and Treat 2 x week 30 days  Medical Diagnosis from Referral: M54.42 (ICD-10-CM) - Acute left-sided low back pain with left-sided sciatica  Evaluation Date: 9/29/2020  Authorization Period Expiration: 9-11-21  Plan of Care Expiration: 11-6-20  Visit # / Visits authorized: 9 / 12  MD Follow up appointment: 11-6-20    Time In:8:24  Time Out: 9:18  Total Billable Time: 54 minutes     Precautions: Standard    Subjective     Pt reports: feeling ok, getting better.  Pt states she tried to start to decrease medication but does need meds for now Pt did not take meds this AM Pt states she is walking slowly and able to walk with normal gait pattern without antalgia just slow Pt states she did not need to get a cane for walking slowly doesn't grab as much    Response to previous treatment: did ok  Functional change: walking a little better      Pain: 3.5/10 to start  At worst sharp pain is 4.5/10  at best  2.5/10  3/10after modified push/pull at end of 2.5/10  Initial eval: Current 4/10, worst 9/10, best 4/10 Pt feels she was higher level pain than she indicated at IE, but feels numbers now are more accurate and that she has improved   Location: bilateral LB and R leg     Objective       AR R pelvis     TREATMENT    Portia received therapeutic exercises to develop strength, endurance, ROM, flexibility and core stabilization for 44 minutes including:     Pt with pelvic dysfunction.  Pt had not recently performed push/pull ex.  Pt performed push/pull exercise and able to note positive change in symptoms.  Instructed pt further in increased awareness of symptoms.  Instructed pt again in need to perform  push/pull at onset of increased symptoms.  Instructed pt to perform q 2 hours    HSS supine x 10  Piriformis stretch x 10  Bridge x 10, required cueing for proper performance and recruitment   Pelvic tilt x 2/10   SLR x 10  Clam sidelying x 10   Hip abd sidelying x 10  Modified push/pull x 3        Portia received the following manual therapy techniques: Dry needling were applied to the: R gluteals/piriformis for 10 minutes, including:    Patient  gave verbal consent to undergo dry needling. Dry needling with trigger point/manual therapy techniques was performed to R piriformis.  All needles were removed and changes in signs and symptoms were noted.  Dry needling was performed to decrease inflammation, increase circulation, decrease pain and restore homeostasis.      (NP)Portia received the following direct contact modalities after being cleared for contraindications: Ultrasound:  Lilli received ultrasound to manage pain and inflammation at 100 % duty cycle applied to the R piriformis 1 Mhz at an intensity of 1.5  W/cm2  for a duration of 8 minutes. Patient tolerated treatment well without adverse effects.   Pt reports relief with US.     Home Exercises Provided and Patient Education Provided     Education provided:   - HEP  - Instructed pt further in increased awareness of symptoms.  Instructed pt in push/pull at onset of increased symptoms.    -     Written Home Exercises Provided: yes   Exercises were reviewed and Portia was able to demonstrate them prior to the end of the session.  Portia demonstrated good  understanding of the education provided.     See EMR under Patient Instructions for exercises provided at initial eval and 10/1/2020, 10-22-20, 11-3-20.    Assessment   Pt with slightly improved symptoms.  Pt in dysfunction and unaware of when she is in dysfunction and understands to perform q 2 hours Able to progress to SLR and hip abd sidelying Pt requires much cueing for proper performance and would forget how  to perform modified push/pull within one session.  At end of session did not realign and did with pt again and realized pt was pushing LLE too hard making self mob ineffective.  Improved performance and understanding after further instruction.      Portia is progressing well towards her goals.   Pt prognosis is Good.     Pt will continue to benefit from skilled outpatient physical therapy to address the deficits listed in the problem list box on initial evaluation, provide pt/family education and to maximize pt's level of independence in the home and community environment.     Pt's spiritual, cultural and educational needs considered and pt agreeable to plan of care and goals.     Anticipated barriers to physical therapy: none    GOALS:   Short Term Goals:  2 weeks   Increase range of motion 25% MET  Increase strength 1/2 muscle grade Progressing, not met  Improve postural awareness of pelvis to independently identify dysfunction with min assist from PT MET  Be able to perform HEP with minimal cueing required Progressing, not met     Long Term Goals: 30 days Progressing, not met LTG's  Increase range of motion to 75% to 100% full   Improve muscle strength 1 muscle grade  Improve and stabilize proper pelvic positioning  Restore ability to ambulate with normal pain free gait  Walking for ADL and exercise will be restored without increased pain  Restore ability to stand for ADL without increased pain  Restore ability to lift and carry items such as groceries without increased pain  Restore ability to perform ADL's and household activities independently and without increased pain       Plan   Continue with established plan of care towards PT goals.  Reassess next visit

## 2020-11-05 ENCOUNTER — CLINICAL SUPPORT (OUTPATIENT)
Dept: REHABILITATION | Facility: HOSPITAL | Age: 75
End: 2020-11-05
Payer: MEDICARE

## 2020-11-05 DIAGNOSIS — M54.42 CHRONIC BILATERAL LOW BACK PAIN WITH LEFT-SIDED SCIATICA: ICD-10-CM

## 2020-11-05 DIAGNOSIS — G89.29 CHRONIC BILATERAL LOW BACK PAIN WITH LEFT-SIDED SCIATICA: ICD-10-CM

## 2020-11-05 DIAGNOSIS — M62.81 MUSCLE WEAKNESS: ICD-10-CM

## 2020-11-05 PROCEDURE — 97035 APP MDLTY 1+ULTRASOUND EA 15: CPT | Mod: PN | Performed by: PHYSICAL THERAPIST

## 2020-11-05 PROCEDURE — 97140 MANUAL THERAPY 1/> REGIONS: CPT | Mod: PN | Performed by: PHYSICAL THERAPIST

## 2020-11-05 PROCEDURE — 97110 THERAPEUTIC EXERCISES: CPT | Mod: PN | Performed by: PHYSICAL THERAPIST

## 2020-11-05 NOTE — PLAN OF CARE
Physical Therapy Progress and Daily Treatment Note     Name: Lilli Foote [place]  Clinic Number: [id number]    Therapy Diagnosis:   Encounter Diagnoses   Name Primary?    Chronic bilateral low back pain with left-sided sciatica     Muscle weakness      Physician: Alysha Vang PA-C    Visit Date: 11/5/2020    Physician Orders: PT Eval and Treat 2 x week 30 days  Medical Diagnosis from Referral: M54.42 (ICD-10-CM) - Acute left-sided low back pain with left-sided sciatica  Evaluation Date: 9/29/2020  Authorization Period Expiration: 9-11-21  Plan of Care Expiration: 12-3-20  Visit # / Visits authorized: 10 / 12  MD Follow up appointment: 11-6-20    Time In:9:31  Time Out: 10:37  Total Billable Time: 54 minutes     Precautions: Standard    Subjective     Pt reports: doing ok  Feels walking better and woke up with 2.5 pain.  Pt did more push/pull and after push/pull this AM 2/10 Pt states using icy hot spray and has decreased meds to once a day. Pt reported concerns that she has to help her  normally due to his medical conditions and now is less able to do for him and has her upset   Response to previous treatment: did ok  Functional change: walking a little better      Pain: 2/10 to start at worst 4.5 at best 1/10     Initial eval: Current 4/10, worst 9/10, best 4/10   Location: bilateral LB and R leg     Objective       Functional assessment:   - walking:  Walking slowly and cautiously without antalgia at IE antalgic L  - sit to stand: very min difficulty at IE min difficulty  - sit to supine:very min difficulty at IE  min difficulty       - supine to sit: very min difficulty at IE  mod difficulty, had to work with pt to log roll to transfer   - supine to prone: able to perform with min difficulty at IE not tested    Lumbar active range of motion in standing is: 11-5-20  - flexion - ankle                     - extension -  50%                         - left side bending -  To knee         - right  side bending -  To knee  Slight pain              Lumbar active range of motion in standing is: initial eval  - flexion - mid thigh                     - extension -  25%                         - left side bending -  Mid thigh         - right side bending -  Mid thigh   Pain all planes           Pelvic positioning: pt continues to develop dysfunction improving in performing ex for stability at IE AR R      Flexibility testing:  - hamstrings:     90/90 test same as IE R 20 L 30           - gastrocnemius:   DF ankle R 5 degrees L 5 degrees    Muscle Strength 11-5-20  MMT R L   Hip flexion 4-/5 4/5   Hip abduction 3+/5 4-/5   Hip extension 3+/5 3+/5   Knee extension 5/5 5/5   Knee flexion 5/5 5/5        Muscle Strength Pt with acute pain with active motion of L LE so deferred further MMT Initial eval  MMT R L   Hip flexion 3+/5 3/5   Knee extension 5/5 5/5   Knee flexion 5/5 5/5           TREATMENT    Portia received therapeutic exercises to develop strength, endurance, ROM, flexibility and core stabilization for 35 minutes including:     Pt with pelvic dysfunction.  Pt performed push/pull exercise and unable to note positive change in symptoms.  Instructed pt to perform push/pull q 2 hours  Discussed pt progression and how to focus on progress     HSS supine x 10  Piriformis stretch x 10  Bridge x 10, required cueing for proper performance and recruitment   Pelvic tilt x 2/10  SLR x 10  Clam sidelying x 10  Hip abd sidelying x 10  Modified push/pull x 3        Portia received the following manual therapy techniques: Dry needling were applied to the: R gluteals/piriformis for 10 minutes, including:    Patient  gave verbal consent to undergo dry needling. Dry needling with trigger point/manual therapy techniques was performed to R piriformis.  All needles were removed and changes in signs and symptoms were noted.  Dry needling was performed to decrease inflammation, increase circulation, decrease pain and restore  homeostasis.      Portia received the following direct contact modalities after being cleared for contraindications: Ultrasound:  Lilli received ultrasound to manage pain and inflammation at 100 % duty cycle applied to the R piriformis 1 Mhz at an intensity of 1.5  W/cm2  for a duration of 8 minutes. Patient tolerated treatment well without adverse effects.   Pt reports relief with US.     Home Exercises Provided and Patient Education Provided     Education provided:   - HEP  - Instructed pt further in increased awareness of symptoms.  Instructed pt in push/pull at onset of increased symptoms.    -     Written Home Exercises Provided: none today continue with prior HEP  Exercises were reviewed and Portia was able to demonstrate them prior to the end of the session.  Portia demonstrated good  understanding of the education provided.     See EMR under Patient Instructions for exercises provided at initial eval and 10/1/2020, 10-22-20, 11-3-20.    Assessment   Patient demonstrates improvement in range of motion, strength, stabilization and function.  Pt was very emotional at start of session with concerns about how she is limited functionally.  After discussion pt appears to have better understanding of healing process and is able to see that she is improving from initial eval.     Portia is progressing well towards her goals.   Pt prognosis is Good.     Pt will continue to benefit from skilled outpatient physical therapy to address the deficits listed in the problem list box on initial evaluation, provide pt/family education and to maximize pt's level of independence in the home and community environment.     Pt's spiritual, cultural and educational needs considered and pt agreeable to plan of care and goals.     Anticipated barriers to physical therapy: none    GOALS:   Short Term Goals:  2 weeks   Increase range of motion 25% MET  Increase strength 1/2 muscle grade MET  Improve postural awareness of pelvis to  independently identify dysfunction with min assist from PT MET  Be able to perform HEP with minimal cueing required Progressing, not met     Long Term Goals: 30 days Progressing, not met LTG's  Increase range of motion to 75% to 100% full   Improve muscle strength 1 muscle grade  Improve and stabilize proper pelvic positioning  Restore ability to ambulate with normal pain free gait  Walking for ADL and exercise will be restored without increased pain  Restore ability to stand for ADL without increased pain  Restore ability to lift and carry items such as groceries without increased pain  Restore ability to perform ADL's and household activities independently and without increased pain       Plan   If you concur, I recommend patient continue with physical therapy 2 times a week  for 4 weeks.  Please advise us of your  recommendations. Thank you for allowing us to assist in the care of your patient.      Corine Andre, MS, PT          I certify the need for these services furnished under this plan of treatment and while under my care.    ____________________________________ Physician/Referring Practitioner                                Date of Signature

## 2020-11-05 NOTE — PROGRESS NOTES
Physical Therapy Progress and Daily Treatment Note     Name: Lilli Foote [place]  Clinic Number: [id number]    Therapy Diagnosis:   Encounter Diagnoses   Name Primary?    Chronic bilateral low back pain with left-sided sciatica     Muscle weakness      Physician: Alysha Vang PA-C    Visit Date: 11/5/2020    Physician Orders: PT Eval and Treat 2 x week 30 days  Medical Diagnosis from Referral: M54.42 (ICD-10-CM) - Acute left-sided low back pain with left-sided sciatica  Evaluation Date: 9/29/2020  Authorization Period Expiration: 9-11-21  Plan of Care Expiration: 12-3-20  Visit # / Visits authorized: 10 / 12  MD Follow up appointment: 11-6-20    Time In:9:31  Time Out: 10:37  Total Billable Time: 54 minutes     Precautions: Standard    Subjective     Pt reports: doing ok  Feels walking better and woke up with 2.5 pain.  Pt did more push/pull and after push/pull this AM 2/10 Pt states using icy hot spray and has decreased meds to once a day. Pt reported concerns that she has to help her  normally due to his medical conditions and now is less able to do for him and has her upset   Response to previous treatment: did ok  Functional change: walking a little better      Pain: 2/10 to start at worst 4.5 at best 1/10     Initial eval: Current 4/10, worst 9/10, best 4/10   Location: bilateral LB and R leg     Objective       Functional assessment:   - walking:  Walking slowly and cautiously without antalgia at IE antalgic L  - sit to stand: very min difficulty at IE min difficulty  - sit to supine:very min difficulty at IE  min difficulty       - supine to sit: very min difficulty at IE  mod difficulty, had to work with pt to log roll to transfer   - supine to prone: able to perform with min difficulty at IE not tested    Lumbar active range of motion in standing is: 11-5-20  - flexion - ankle                     - extension -  50%                         - left side bending -  To knee         - right  side bending -  To knee  Slight pain              Lumbar active range of motion in standing is: initial eval  - flexion - mid thigh                     - extension -  25%                         - left side bending -  Mid thigh         - right side bending -  Mid thigh   Pain all planes           Pelvic positioning: pt continues to develop dysfunction improving in performing ex for stability at IE AR R      Flexibility testing:  - hamstrings:     90/90 test same as IE R 20 L 30           - gastrocnemius:   DF ankle R 5 degrees L 5 degrees    Muscle Strength 11-5-20  MMT R L   Hip flexion 4-/5 4/5   Hip abduction 3+/5 4-/5   Hip extension 3+/5 3+/5   Knee extension 5/5 5/5   Knee flexion 5/5 5/5        Muscle Strength Pt with acute pain with active motion of L LE so deferred further MMT Initial eval  MMT R L   Hip flexion 3+/5 3/5   Knee extension 5/5 5/5   Knee flexion 5/5 5/5           TREATMENT    Portia received therapeutic exercises to develop strength, endurance, ROM, flexibility and core stabilization for 35 minutes including:     Pt with pelvic dysfunction.  Pt performed push/pull exercise and unable to note positive change in symptoms.  Instructed pt to perform push/pull q 2 hours  Discussed pt progression and how to focus on progress     HSS supine x 10  Piriformis stretch x 10  Bridge x 10, required cueing for proper performance and recruitment   Pelvic tilt x 2/10  SLR x 10  Clam sidelying x 10  Hip abd sidelying x 10  Modified push/pull x 3        Portia received the following manual therapy techniques: Dry needling were applied to the: R gluteals/piriformis for 10 minutes, including:    Patient  gave verbal consent to undergo dry needling. Dry needling with trigger point/manual therapy techniques was performed to R piriformis.  All needles were removed and changes in signs and symptoms were noted.  Dry needling was performed to decrease inflammation, increase circulation, decrease pain and restore  homeostasis.      Portia received the following direct contact modalities after being cleared for contraindications: Ultrasound:  Lilli received ultrasound to manage pain and inflammation at 100 % duty cycle applied to the R piriformis 1 Mhz at an intensity of 1.5  W/cm2  for a duration of 8 minutes. Patient tolerated treatment well without adverse effects.   Pt reports relief with US.     Home Exercises Provided and Patient Education Provided     Education provided:   - HEP  - Instructed pt further in increased awareness of symptoms.  Instructed pt in push/pull at onset of increased symptoms.    -     Written Home Exercises Provided: none today continue with prior HEP  Exercises were reviewed and Portia was able to demonstrate them prior to the end of the session.  Portia demonstrated good  understanding of the education provided.     See EMR under Patient Instructions for exercises provided at initial eval and 10/1/2020, 10-22-20, 11-3-20.    Assessment   Patient demonstrates improvement in range of motion, strength, stabilization and function.  Pt was very emotional at start of session with concerns about how she is limited functionally.  After discussion pt appears to have better understanding of healing process and is able to see that she is improving from initial eval.     Portia is progressing well towards her goals.   Pt prognosis is Good.     Pt will continue to benefit from skilled outpatient physical therapy to address the deficits listed in the problem list box on initial evaluation, provide pt/family education and to maximize pt's level of independence in the home and community environment.     Pt's spiritual, cultural and educational needs considered and pt agreeable to plan of care and goals.     Anticipated barriers to physical therapy: none    GOALS:   Short Term Goals:  2 weeks   Increase range of motion 25% MET  Increase strength 1/2 muscle grade MET  Improve postural awareness of pelvis to  independently identify dysfunction with min assist from PT MET  Be able to perform HEP with minimal cueing required Progressing, not met     Long Term Goals: 30 days Progressing, not met LTG's  Increase range of motion to 75% to 100% full   Improve muscle strength 1 muscle grade  Improve and stabilize proper pelvic positioning  Restore ability to ambulate with normal pain free gait  Walking for ADL and exercise will be restored without increased pain  Restore ability to stand for ADL without increased pain  Restore ability to lift and carry items such as groceries without increased pain  Restore ability to perform ADL's and household activities independently and without increased pain       Plan   If you concur, I recommend patient continue with physical therapy 2 times a week  for 4 weeks.  Please advise us of your  recommendations. Thank you for allowing us to assist in the care of your patient.      Corine Andre, MS, PT          I certify the need for these services furnished under this plan of treatment and while under my care.    ____________________________________ Physician/Referring Practitioner                                Date of Signature

## 2020-11-06 ENCOUNTER — OFFICE VISIT (OUTPATIENT)
Dept: SPINE | Facility: CLINIC | Age: 75
End: 2020-11-06
Payer: COMMERCIAL

## 2020-11-06 VITALS
HEART RATE: 84 BPM | BODY MASS INDEX: 19.23 KG/M2 | WEIGHT: 115.44 LBS | DIASTOLIC BLOOD PRESSURE: 75 MMHG | SYSTOLIC BLOOD PRESSURE: 134 MMHG | HEIGHT: 65 IN

## 2020-11-06 DIAGNOSIS — M47.816 LUMBAR SPONDYLOSIS: Primary | ICD-10-CM

## 2020-11-06 DIAGNOSIS — M53.3 CHRONIC RIGHT SI JOINT PAIN: ICD-10-CM

## 2020-11-06 DIAGNOSIS — G89.29 CHRONIC RIGHT SI JOINT PAIN: ICD-10-CM

## 2020-11-06 PROCEDURE — 99999 PR PBB SHADOW E&M-EST. PATIENT-LVL III: ICD-10-PCS | Mod: PBBFAC,,, | Performed by: PHYSICIAN ASSISTANT

## 2020-11-06 PROCEDURE — 99213 PR OFFICE/OUTPT VISIT, EST, LEVL III, 20-29 MIN: ICD-10-PCS | Mod: S$GLB,,, | Performed by: PHYSICIAN ASSISTANT

## 2020-11-06 PROCEDURE — 99999 PR PBB SHADOW E&M-EST. PATIENT-LVL III: CPT | Mod: PBBFAC,,, | Performed by: PHYSICIAN ASSISTANT

## 2020-11-06 PROCEDURE — 99213 OFFICE O/P EST LOW 20 MIN: CPT | Mod: S$GLB,,, | Performed by: PHYSICIAN ASSISTANT

## 2020-11-06 NOTE — PROGRESS NOTES
Back and Spine Follow Up    Patient ID: Lilli Stringer is a 74 y.o. female.    Chief Complaint   Patient presents with    Low-back Pain       Review of Systems   Constitutional: Negative for activity change, appetite change, chills, fever and unexpected weight change.   HENT: Negative for tinnitus, trouble swallowing and voice change.    Respiratory: Negative for apnea, cough, chest tightness and shortness of breath.    Cardiovascular: Negative for chest pain and palpitations.   Gastrointestinal: Negative for constipation, diarrhea, nausea and vomiting.   Genitourinary: Negative for difficulty urinating, dysuria, frequency and urgency.   Musculoskeletal: Positive for back pain. Negative for gait problem, neck pain and neck stiffness.   Skin: Negative for wound.   Neurological: Negative for dizziness, tremors, seizures, facial asymmetry, speech difficulty, weakness, light-headedness, numbness and headaches.   Psychiatric/Behavioral: Negative for confusion and decreased concentration.       Past Medical History:   Diagnosis Date    Allergy     Endometriosis of uterus      Social History     Socioeconomic History    Marital status:      Spouse name: Not on file    Number of children: 1    Years of education: Not on file    Highest education level: Not on file   Occupational History    Occupation: [employer]   Social Needs    Financial resource strain: Not on file    Food insecurity     Worry: Not on file     Inability: Not on file    Transportation needs     Medical: Not on file     Non-medical: Not on file   Tobacco Use    Smoking status: Current Some Day Smoker     Packs/day: 0.25     Types: Cigarettes    Smokeless tobacco: Never Used   Substance and Sexual Activity    Alcohol use: Yes     Comment: social    Drug use: No    Sexual activity: Yes     Partners: Male     Birth control/protection: None, Surgical   Lifestyle    Physical activity     Days per week: Not on file     Minutes  "per session: Not on file    Stress: Not on file   Relationships    Social connections     Talks on phone: Not on file     Gets together: Not on file     Attends Tenriism service: Not on file     Active member of club or organization: Not on file     Attends meetings of clubs or organizations: Not on file     Relationship status: Not on file   Other Topics Concern    Not on file   Social History Narrative    Not on file     Family History   Problem Relation Age of Onset    Alzheimer's disease Mother     Cancer Mother     Breast cancer Neg Hx     Ovarian cancer Neg Hx      Review of patient's allergies indicates:   Allergen Reactions    Iodine and iodide containing products     Sulfa (sulfonamide antibiotics)        Current Outpatient Medications:     fluticasone (FLONASE) 50 mcg/actuation nasal spray, APPLY TWO SPRAYS NASALLY ONCE TO TWICE DAILY DEPENDING ON SEVERITY OFSYMPTOMS (Patient taking differently: as needed. ), Disp: 1 Bottle, Rfl: 0    methocarbamoL (ROBAXIN) 750 MG Tab, Take 1 tablet (750 mg total) by mouth 2 (two) times daily as needed (muscle spasms/ pain)., Disp: 30 tablet, Rfl: 0    naproxen sodium (ANAPROX) 220 MG tablet, Take 220 mg by mouth every evening., Disp: , Rfl:     predniSONE (DELTASONE) 10 MG tablet, Take 40mg for 2days, take 30mg for 2 days, take 20mg for 2 days, take 10mg for 2 days (Patient not taking: Reported on 11/6/2020), Disp: 20 tablet, Rfl: 0    Vitals:    11/06/20 1003   BP: 134/75   BP Location: Right arm   Patient Position: Sitting   BP Method: Medium (Automatic)   Pulse: 84   Weight: 52.3 kg (115 lb 6.6 oz)   Height: 5' 5" (1.651 m)       Physical Exam  Vitals signs and nursing note reviewed.   Constitutional:       Appearance: She is well-developed.   HENT:      Head: Normocephalic and atraumatic.   Eyes:      Pupils: Pupils are equal, round, and reactive to light.   Neck:      Musculoskeletal: Normal range of motion and neck supple.   Cardiovascular:      Rate " and Rhythm: Normal rate.   Pulmonary:      Effort: Pulmonary effort is normal.   Musculoskeletal: Normal range of motion.   Skin:     General: Skin is warm and dry.   Neurological:      Mental Status: She is alert and oriented to person, place, and time.      Coordination: Finger-Nose-Finger Test, Heel to Shin Test and Romberg Test normal.      Gait: Gait is intact. Tandem walk normal.      Deep Tendon Reflexes:      Reflex Scores:       Tricep reflexes are 2+ on the right side and 2+ on the left side.       Bicep reflexes are 2+ on the right side and 2+ on the left side.       Brachioradialis reflexes are 2+ on the right side and 2+ on the left side.       Patellar reflexes are 2+ on the right side and 2+ on the left side.       Achilles reflexes are 2+ on the right side and 2+ on the left side.  Psychiatric:         Speech: Speech normal.         Behavior: Behavior normal.         Thought Content: Thought content normal.         Judgment: Judgment normal.         Neurologic Exam     Mental Status   Oriented to person, place, and time.   Oriented to person.   Oriented to place.   Oriented to time.   Follows 3 step commands.   Attention: normal. Concentration: normal.   Speech: speech is normal   Level of consciousness: alert  Knowledge: consistent with education.   Able to name object. Able to read. Able to repeat. Able to write. Normal comprehension.     Cranial Nerves     CN II   Visual acuity: normal  Right visual field deficit: none  Left visual field deficit: none     CN III, IV, VI   Pupils are equal, round, and reactive to light.  Right pupil: Size: 3 mm. Shape: regular. Reactivity: brisk. Consensual response: intact.   Left pupil: Size: 3 mm. Shape: regular. Reactivity: brisk. Consensual response: intact.   CN III: no CN III palsy  CN VI: no CN VI palsy  Nystagmus: none   Diplopia: none  Ophthalmoparesis: none  Conjugate gaze: present    CN V   Right facial sensation deficit: none  Left facial sensation  deficit: none    CN VII   Right facial weakness: none  Left facial weakness: none    CN VIII   Hearing: intact    CN IX, X   CN IX normal.   CN X normal.     CN XI   Right sternocleidomastoid strength: normal  Left sternocleidomastoid strength: normal  Right trapezius strength: normal  Left trapezius strength: normal    CN XII   Fasciculations: absent  Tongue deviation: none    Motor Exam   Muscle bulk: normal  Overall muscle tone: normal  Right arm pronator drift: absent  Left arm pronator drift: absent    Strength   Right neck flexion: 5/5  Left neck flexion: 5/5  Right neck extension: 5/5  Left neck extension: 5/5  Right deltoid: 5/5  Left deltoid: 5/5  Right biceps: 5/5  Left biceps: 5/5  Right triceps: 5/5  Left triceps: 5/5  Right wrist flexion: 5/5  Left wrist flexion: 5/5  Right wrist extension: 5/5  Left wrist extension: 5/5  Right interossei: 5/5  Left interossei: 5/5  Right abdominals: 5/5  Left abdominals: 5/5  Right iliopsoas: 5/5  Left iliopsoas: 5/5  Right quadriceps: 5/5  Left quadriceps: 5/5  Right hamstrin/5  Left hamstrin/5  Right glutei: 5/5  Left glutei: 5/5  Right anterior tibial: 5/5  Left anterior tibial: 5/5  Right posterior tibial: 5/5  Left posterior tibial: 5/5  Right peroneal: 5/5  Left peroneal: 5/5  Right gastroc: 5/5  Left gastroc: 5/5    Sensory Exam   Right arm light touch: normal  Left arm light touch: normal  Right leg light touch: normal  Left leg light touch: normal  Right arm vibration: normal  Left arm vibration: normal  Right arm pinprick: normal  Left arm pinprick: normal    Gait, Coordination, and Reflexes     Gait  Gait: normal    Coordination   Romberg: negative  Finger to nose coordination: normal  Heel to shin coordination: normal  Tandem walking coordination: normal    Tremor   Resting tremor: absent  Intention tremor: absent  Action tremor: absent    Reflexes   Right brachioradialis: 2+  Left brachioradialis: 2+  Right biceps: 2+  Left biceps: 2+  Right  triceps: 2+  Left triceps: 2+  Right patellar: 2+  Left patellar: 2+  Right achilles: 2+  Left achilles: 2+  Right Garrison: absent  Left Garrison: absent  Right ankle clonus: absent  Left ankle clonus: absent      Provider dictation:  74 year old female who is relatively healthy presents for follow up of back pain under workers comp after undergoing further PT and seeing pain management.  Recall, she was moving some items around [employer], where she works, when she developed pain in the back.  At last assessment, she was experiencing pain in the left lower back with radiation into the left posterior thigh to the knee.  With home exercise and PT, left sided pain has resolved.  She currently has pain in the right buttock/ lower back.  She denies any further radicular leg pain, numbness, tingling.  She is still undergoing PT and has seen slow improvement.  She was seen by Dr. Cade (pain management) who offered right SI joint injection, but she declined at the time.      Current medications: robaxin,aleve, icy hot  Medications tried:oral steroid taper  Physical therapy:  undergoing  Interventional Procedures:  none    Oswestry score: 42%.  PHQ:  1.    On exam, she has 5/5 strength with 2+ DTR and no sensory deficits.  Gait and station fluid.  No tenderness along the spine.  No pain with axial facet loading.  Pain with palpation over the right SI joint.    Xray lumbar spine from 8-12-20 personally reviewed.  There are minimal/ mild degenerative changes with good alignment. No fracture noted.    MRI lumbar spine from [facility] 9-24-20 personally reviewed.  There are mild multilevel degenerative changes without significant central canal stenosis or foraminal stenosis at any area.  At L4/5 there is a broad based disc and facet hypertrophy with bilateral facet effusions.    Ms. Pearce now has resolved left sided lower back pain/ radiculopathy and now onset right buttock/ lower back pain more consistent with SI joint pain than facet  arthropathy or myofascial pain.  We discuseed further PT vs. Proceeding with Right SI joint injection.   She would like to try a few more weeks of PT before committting to SI injection as PT is helping.  If no resolution with PT in the next 4 weeks, I strongly recommend she consider SI joint injection.      Should she descide to proceed with SI joint injection, she is requesting to see Dr. Christensen and not Dr. Cade.  I have discussed with Dr. Christensen and patient can be set up for direct to procedure with him without needing a formal clinic visit with him first.      Regarding work, I recommend limiting lifting to no more than 10 pounds.  She should remain out of work for the next 4 weeks to allow time for further physical therapy.    Visit Diagnosis:  Lumbar spondylosis  -     Ambulatory referral/consult to Physical/Occupational Therapy; Future; Expected date: 11/06/2020    Chronic right SI joint pain  -     Ambulatory referral/consult to Physical/Occupational Therapy; Future; Expected date: 11/06/2020        Total time spent counseling greater than fifty percent of total visit time.  Counseling included discussion regarding imaging findings, diagnosis possibilities, treatment options, risks and benefits.   The patient had many questions regarding the options and long-term effects.

## 2020-11-06 NOTE — PROGRESS NOTES
Hello! We are asking you to complete following questionnaire prior to your upcoming virtual visit with Alysha Vang. This questionnaire has been designed to give us information on how your low back or leg pain has affected your ability to manage everyday life. Please respond with only ONE number answer to each question which best applies to you TODAY. This will need to be completed and sent back prior to checking in for your appointment.    EXAMPLE OF RESPONSE:  Q1: 2       Q2: 1      Q3: 4     Q1: Pain Intensity 2   0 - No pain  1 - Very Mild pain   2 - Moderate Pain  3 - Fairly Severe Pain  4 - Very Severe Pain  5 - Worst Imaginable Pain    Q2: Personal Care (washing, dressing, etc) 1  0 - I can look after myself normally without causing extra pain  1 - I can look after myself normally but it causes extra pain  2 - It is painful to look after myself and I am slow and careful  3 - I need some help but manage most of my personal care   4 - I need help everyday in most aspects of self-care   5 - I do not get dressed, I wash with difficulty and stay in bed    Q3: Lifting 4  0 - I can lift heavy weights without extra pain  1 - I can lift heavy weights but it gives extra pain   2 - Pain prevents me from lifting heavy weights off the floor, but I can manage if they are conveniently placed (eg. on a table)  3 - Pain prevents me from lifting heavy weights, but I can manage light to medium weights if they are conveniently positioned   4 - I can lift very light weights   5 - I cannot lift or carry anything at all     Q4: Walking 2  0 - Pain does not prevent me walking any distance   1 - Pain prevents me from walking more than 1 mile  2 - Pain prevents me from walking more than 1/2 mile  3 - Pain prevents me from walking more than 100 yards  4 - I can only walk using a stick or crutches  5 - I am in bed most of the time    Q5: Sitting 3  0 - I can sit in any chair as long as I like   1 - I can only sit in my favorite chair  as long as I like   2 - Pain prevents me from sitting for more than one hour   3 - Pain prevents me from sitting for more than 30 minutes   4 - Pain prevents me from sitting for more than 10 minutes   5 - Pain prevents me from sitting at all     Q6: Standing 2  0 - I can stand as long as I want without extra pain  1 - I can stand as long as I want but it gives me extra pain  2 - Pain prevents me from standing for more than 1 hour   3 - Pain prevents me from standing for more than 30 minutes   4 - Pain prevents me from standing for more than 10 minutes   5 - Pain prevents me from standing at all     Q7: Sleeping 1  0 - My sleep is never disturbed by pain   1 - My sleep is occasionally disturbed by pain   2 - Because of pain, I have less than 6 hours of sleep  3 - Because of pain, I have less than 4 hours of sleep  4 - Because of pain, I have less than 2 hours of sleep  5 - Pain prevents me from sleeping at all    Q8: Sex Life (if applicable) N/A  0 - My sex life is normal and causes no extra pain  1 - My sex life is normal but causes some extra pain   2 - My sex life is nearly normal but is very painful   3 - My sex life is severely restricted by pain  4 - My sex life is nearly absent because of pain  5 - Pain prevents any sex life at all    Q9: Social Life 3   0 - My social life is normal and gives me no extra pain  1 - My social life is normal but increases the degree of pain  2 - Pain has no significant effect on my social life apart from limiting my more energetic interests such as sports   3 - Pain has restricted my social life and I do not go out as often   4 - Pain has restricted my social life to my house  5 - I have no social life because of pain    Q10: Traveling N/A  0 - I can travel anywhere without pain  1 - I can travel anywhere but it gives me extra pain   2 - Pain is bad but I manage journeys over 2 hours   3 - Pain restricts me to journeys of less than 1 hour  4 - Pain restricts me to short necessary  journeys under 30 minutes   5 - Pain prevents me from traveling except to receive treatment

## 2020-11-10 ENCOUNTER — CLINICAL SUPPORT (OUTPATIENT)
Dept: REHABILITATION | Facility: HOSPITAL | Age: 75
End: 2020-11-10
Payer: MEDICARE

## 2020-11-10 DIAGNOSIS — M62.81 MUSCLE WEAKNESS: ICD-10-CM

## 2020-11-10 DIAGNOSIS — G89.29 CHRONIC BILATERAL LOW BACK PAIN WITH LEFT-SIDED SCIATICA: ICD-10-CM

## 2020-11-10 DIAGNOSIS — M54.42 CHRONIC BILATERAL LOW BACK PAIN WITH LEFT-SIDED SCIATICA: ICD-10-CM

## 2020-11-10 PROCEDURE — 97110 THERAPEUTIC EXERCISES: CPT | Mod: PN,CQ

## 2020-11-10 NOTE — PROGRESS NOTES
Physical Therapy Daily Treatment Note     Name: Lilli Foote [place]  Clinic Number: [id number]    Therapy Diagnosis:   Encounter Diagnoses   Name Primary?    Chronic bilateral low back pain with left-sided sciatica     Muscle weakness      Physician: Alysha Vang PA-C    Visit Date: 11/10/2020    Physician Orders: PT Eval and Treat 2 x week 30 days  Medical Diagnosis from Referral: M54.42 (ICD-10-CM) - Acute left-sided low back pain with left-sided sciatica  Evaluation Date: 9/29/2020  Authorization Period Expiration: 9-11-21  Plan of Care Expiration: 12-3-20  Visit # / Visits authorized: 11 / 12  MD Follow up appointment: 11-6-20    Time In: 9:20  Time Out: 10:08  Total Billable Time: 48 minutes     Precautions: Standard    Subjective     Pt reports: doing a little better but still getting pain with movements. Not doing push/pull as often as she does not feel she needs it as much.  Dr. Mobley saw patient and was happy with progress so far.  Response to previous treatment: did ok  Functional change: standing up straighter    Pain: 2.5/10 to start at worst 4.5 at best 1/10     Initial eval: Current 4/10, worst 9/10, best 4/10   Location: bilateral LB and R leg     Objective          TREATMENT    Portia received therapeutic exercises to develop strength, endurance, ROM, flexibility and core stabilization for 38 minutes including:     Pt with pelvic dysfunction.  Pt performed push/pull exercise and unable to note positive change in symptoms.  Instructed pt to perform push/pull every 2 hours  Discussed pt progression and how to focus on progress     HSS supine x 10  Piriformis stretch x 10  Bridge x 2/10, required cueing for proper performance and recruitment   Pelvic tilt x 2/10 - increased difficulty performing, tendency to push through legs and gluts not abdominals  SLR x 10  Clam sidelying x 10  Hip abd sidelying x 10  Modified push/pull x 3      Manual therapy techniques NOT performed this date.  Portia  received the following manual therapy techniques: Dry needling were applied to the: R gluteals/piriformis for 0 minutes, including:    Patient  gave verbal consent to undergo dry needling. Dry needling with trigger point/manual therapy techniques was performed to R piriformis.  All needles were removed and changes in signs and symptoms were noted.  Dry needling was performed to decrease inflammation, increase circulation, decrease pain and restore homeostasis.      Portia received the following direct contact modalities after being cleared for contraindications: Ultrasound:  Lilli received ultrasound to manage pain and inflammation at 100 % duty cycle applied to the R piriformis 1 Mhz at an intensity of 1.5  W/cm2  for a duration of 8 minutes + 2 min setup/cleanup. Patient tolerated treatment well without adverse effects.   Pt reports relief with US.     Home Exercises Provided and Patient Education Provided     Education provided:   - HEP  - Instructed pt further in increased awareness of symptoms.  Instructed pt in push/pull at onset of increased symptoms.    -     Written Home Exercises Provided: none today continue with prior HEP  Exercises were reviewed and Portia was able to demonstrate them prior to the end of the session.  Portia demonstrated good  understanding of the education provided.     See EMR under Patient Instructions for exercises provided at initial eval and 10/1/2020, 10-22-20, 11-3-20.    Assessment   Patient continues with overall good tolerance to treatment without exacerbation of symptoms into the low back. Increased difficulty achieving pelvic tilts this date, noting compensatory movements with legs and gluts. Patient noting improved upright posture and equal weight bearing after performance of push/pull, educated that this is a positive change in symptoms to look for while doing push/pull at home. She izzy benefit from continued core and LE strengthening as able.    Portia is progressing well  towards her goals.   Pt prognosis is Good.     Pt will continue to benefit from skilled outpatient physical therapy to address the deficits listed in the problem list box on initial evaluation, provide pt/family education and to maximize pt's level of independence in the home and community environment.     Pt's spiritual, cultural and educational needs considered and pt agreeable to plan of care and goals.     Anticipated barriers to physical therapy: none    GOALS:   Short Term Goals:  2 weeks   Increase range of motion 25% MET  Increase strength 1/2 muscle grade MET  Improve postural awareness of pelvis to independently identify dysfunction with min assist from PT MET  Be able to perform HEP with minimal cueing required Progressing, not met     Long Term Goals: 30 days Progressing, not met LTG's  Increase range of motion to 75% to 100% full   Improve muscle strength 1 muscle grade  Improve and stabilize proper pelvic positioning  Restore ability to ambulate with normal pain free gait  Walking for ADL and exercise will be restored without increased pain  Restore ability to stand for ADL without increased pain  Restore ability to lift and carry items such as groceries without increased pain  Restore ability to perform ADL's and household activities independently and without increased pain       Plan   Continue with current POC toward established therapy goals    Raegan Lauren, PTA

## 2020-11-11 NOTE — PROGRESS NOTES
Physical Therapy Progress and  Daily Treatment Note     Name: Lilli Foote [place]  Clinic Number: [id number]    Therapy Diagnosis:   Encounter Diagnoses   Name Primary?    Chronic bilateral low back pain with left-sided sciatica Yes    Muscle weakness      Physician: Alysha Vang PA-C    Visit Date: 11/12/2020    Physician Orders: PT Eval and Treat 2 x week 30 days  Medical Diagnosis from Referral: M54.42 (ICD-10-CM) - Acute left-sided low back pain with left-sided sciatica  Evaluation Date: 9/29/2020  Authorization Period Expiration: 9-11-21  Plan of Care Expiration: 12-3-20  Visit # / Visits authorized: 12 / 12  MD Follow up appointment: 11-6-20    Time In: 8:45  Time Out: 9:45  Total Billable Time: 55 minutes     Precautions: Standard    Subjective     Pt reports: feeling better overall    Response to previous treatment: did ok  Functional change: walking better but not all the time    Pain: 2.5/10 to start at worst 3.5 at best 1/10     Initial eval: Current 4/10, worst 9/10, best 4/10   Location: bilateral LB and R leg     Objective      Functional assessment:   - walking:  Improved gait still slow, but not as slow as previously. On 11-5-20 Walking slowly and cautiously without antalgia at IE antalgic L  - sit to stand: very min difficulty at IE min difficulty  - sit to supine:very min difficulty at IE  min difficulty       - supine to sit: very min difficulty at IE  mod difficulty, had to work with pt to log roll to transfer   - supine to prone: able to perform with very min difficulty on 11-5-20 min difficulty at IE not tested     Lumbar active range of motion in standing is: 11-12-20  - flexion - to near toes                     - extension -  50%                         - left side bending -  To knee         - right side bending -  To knee  Slight pain               Lumbar active range of motion in standing is: initial eval  - flexion - mid thigh                     - extension -  25%                          - left side bending -  Mid thigh         - right side bending -  Mid thigh   Pain all planes           Pelvic positioning: pt continues to develop dysfunction improving in performing ex for stability at IE AR R      Flexibility testing:  - hamstrings:     90/90 test same as IE R 20 L 30           - gastrocnemius:   DF ankle R 5 degrees L 5 degrees     Muscle Strength 11-12-20  MMT R L   Hip flexion 4-/5 4/5   Hip abduction 4-/5 4-/5   Hip extension 4-/5 4-/5   Knee extension 5/5 5/5   Knee flexion 5/5 5/5         Muscle Strength Pt with acute pain with active motion of L LE so deferred further MMT Initial eval  MMT R L   Hip flexion 3+/5 3/5   Knee extension 5/5 5/5   Knee flexion        Pelvic positioning: AR R      Flexibility testing:  - hamstrings:     90/90 test same as IE  R 20 L 30           - gastrocnemius:   DF ankle R 5 degrees L 5 degrees    Joint mobility: mod-severe tightness with spring test, not tested at IE          CMS Impairment/Limitation/Restriction for FOTO lumbar Survey     Therapist reviewed FOTO scores for Lilli Stringer    FOTO documents entered into [employer] - see Media section.     Limitation Score: 44% at IE 46%           TREATMENT    Portia received therapeutic exercises to develop strength, endurance, ROM, flexibility and core stabilization for 35 minutes including:     Pt with pelvic dysfunction.  Pt performed push/pull exercise and unable to note positive change in symptoms.  Instructed pt to perform push/pull every 2 hours  Discussed pt progression and how to focus on progress     HSS supine x 10  Piriformis stretch x 10  Bridge x 2/10, required cueing for proper performance and recruitment   Pelvic tilt x 2/10 - increased difficulty performing, tendency to push through legs and gluts not abdominals   Pelvic tilt with march x 10  SLR x 10  Clam sidelying x 10  Hip abd sidelying x 10  Modified push/pull x 3        Portia received the following manual therapy  techniques: Dry needling were applied to the: R gluteals/piriformis for 10 minutes, including:    Patient  gave verbal consent to undergo dry needling. Dry needling with trigger point/manual therapy techniques was performed to R piriformis.  All needles were removed and changes in signs and symptoms were noted.  Dry needling was performed to decrease inflammation, increase circulation, decrease pain and restore homeostasis.      Portia received the following direct contact modalities after being cleared for contraindications: Ultrasound:  Lilli received ultrasound to manage pain and inflammation at 100 % duty cycle applied to the R piriformis 1 Mhz at an intensity of 1.5  W/cm2  for a duration of 8 minutes + 2 min setup/cleanup. Patient tolerated treatment well without adverse effects.   Pt reports relief with US.     Home Exercises Provided and Patient Education Provided     Education provided:   - HEP  - Instructed pt further in increased awareness of symptoms.  Instructed pt in push/pull at onset of increased symptoms.    -     Written Home Exercises Provided: yes  Exercises were reviewed and Portia was able to demonstrate them prior to the end of the session.  Portia demonstrated good  understanding of the education provided.     See EMR under Patient Instructions for exercises provided at initial eval and 10/1/2020, 10-22-20, 11-3-20, 11-12-20.    Assessment   Patient demonstrates improvement in range of motion, strength, stabilization and function.  Pt is improving in speed and walking with a more normal gait, but still slow speed and slightly cautious.  Pt making slow progression with strength though still needs much cueing for proper performance and recruitment with therex.  Pt is still limited with job duties of prolonged walking, standing and lifting and will benefit from continued treatment to assist her in fully achieving her goals.      Portia is progressing well towards her goals.   Pt prognosis is Good.      Pt will continue to benefit from skilled outpatient physical therapy to address the deficits listed in the problem list box on initial evaluation, provide pt/family education and to maximize pt's level of independence in the home and community environment.     Pt's spiritual, cultural and educational needs considered and pt agreeable to plan of care and goals.     Anticipated barriers to physical therapy: none    GOALS:   Short Term Goals:  2 weeks   Increase range of motion 25% MET  Increase strength 1/2 muscle grade MET  Improve postural awareness of pelvis to independently identify dysfunction with min assist from PT MET  Be able to perform HEP with minimal cueing required Progressing, not met     Long Term Goals: 30 days Progressing, not met LTG's  Increase range of motion to 75% to 100% full   Improve muscle strength 1 muscle grade  Improve and stabilize proper pelvic positioning  Restore ability to ambulate with normal pain free gait  Walking for ADL and exercise will be restored without increased pain  Restore ability to stand for ADL without increased pain  Restore ability to lift and carry items such as groceries without increased pain  Restore ability to perform ADL's and household activities independently and without increased pain       Plan   Continue with current POC toward established therapy goals.    Corine Andre, PT

## 2020-11-12 ENCOUNTER — CLINICAL SUPPORT (OUTPATIENT)
Dept: REHABILITATION | Facility: HOSPITAL | Age: 75
End: 2020-11-12
Payer: MEDICARE

## 2020-11-12 DIAGNOSIS — M47.816 LUMBAR SPONDYLOSIS: ICD-10-CM

## 2020-11-12 DIAGNOSIS — M62.81 MUSCLE WEAKNESS: ICD-10-CM

## 2020-11-12 DIAGNOSIS — M54.50 ACUTE LEFT-SIDED LOW BACK PAIN WITHOUT SCIATICA: ICD-10-CM

## 2020-11-12 DIAGNOSIS — M53.3 CHRONIC RIGHT SI JOINT PAIN: ICD-10-CM

## 2020-11-12 DIAGNOSIS — M54.42 CHRONIC BILATERAL LOW BACK PAIN WITH LEFT-SIDED SCIATICA: Primary | ICD-10-CM

## 2020-11-12 DIAGNOSIS — G89.29 CHRONIC BILATERAL LOW BACK PAIN WITH LEFT-SIDED SCIATICA: Primary | ICD-10-CM

## 2020-11-12 DIAGNOSIS — G89.29 CHRONIC RIGHT SI JOINT PAIN: ICD-10-CM

## 2020-11-12 PROCEDURE — 97035 APP MDLTY 1+ULTRASOUND EA 15: CPT | Mod: PN | Performed by: PHYSICAL THERAPIST

## 2020-11-12 PROCEDURE — 97140 MANUAL THERAPY 1/> REGIONS: CPT | Mod: PN | Performed by: PHYSICAL THERAPIST

## 2020-11-12 PROCEDURE — 97110 THERAPEUTIC EXERCISES: CPT | Mod: PN | Performed by: PHYSICAL THERAPIST

## 2020-11-12 RX ORDER — METHOCARBAMOL 750 MG/1
750 TABLET, FILM COATED ORAL 2 TIMES DAILY PRN
Qty: 30 TABLET | Refills: 0 | Status: SHIPPED | OUTPATIENT
Start: 2020-11-12 | End: 2021-11-16 | Stop reason: ALTCHOICE

## 2020-11-12 NOTE — TELEPHONE ENCOUNTER
----- Message from Yonny Tam sent at 11/12/2020 12:01 PM CST -----  Regarding: advice  Contact: self  Type: Needs Medical Advice  Who Called:  self  Symptoms (please be specific):    How long has patient had these symptoms:   Pharmacy name and phone #:    Best Call Back Number: [phone]  Additional Information: Patient want to review information with the nurse.

## 2020-11-12 NOTE — TELEPHONE ENCOUNTER
I spoke with the patient and she wanted to let Alysha Vang know that worker's comp has denied any further physical therapy. I let her know that we got that information today and she is going to check with her private insurance to see if they would approve the physical therapy for her. She also asked if Alysha would refill her Robaxin because it is helping her, and Alysha said that she would refill the medication for her, she indicated understanding.

## 2020-11-12 NOTE — PLAN OF CARE
Physical Therapy Progress and  Daily Treatment Note     Name: Lilli Foote [place]  Clinic Number: [id number]    Therapy Diagnosis:   Encounter Diagnoses   Name Primary?    Chronic bilateral low back pain with left-sided sciatica Yes    Muscle weakness      Physician: Alysha Vang PA-C    Visit Date: 11/12/2020    Physician Orders: PT Eval and Treat 2 x week 30 days  Medical Diagnosis from Referral: M54.42 (ICD-10-CM) - Acute left-sided low back pain with left-sided sciatica  Evaluation Date: 9/29/2020  Authorization Period Expiration: 9-11-21  Plan of Care Expiration: 12-3-20  Visit # / Visits authorized: 12 / 12  MD Follow up appointment: 11-6-20    Time In: 8:45  Time Out: 9:45  Total Billable Time: 55 minutes     Precautions: Standard    Subjective     Pt reports: feeling better overall    Response to previous treatment: did ok  Functional change: walking better but not all the time    Pain: 2.5/10 to start at worst 3.5 at best 1/10     Initial eval: Current 4/10, worst 9/10, best 4/10   Location: bilateral LB and R leg     Objective      Functional assessment:   - walking:  Improved gait still slow, but not as slow as previously. On 11-5-20 Walking slowly and cautiously without antalgia at IE antalgic L  - sit to stand: very min difficulty at IE min difficulty  - sit to supine:very min difficulty at IE  min difficulty       - supine to sit: very min difficulty at IE  mod difficulty, had to work with pt to log roll to transfer   - supine to prone: able to perform with very min difficulty on 11-5-20 min difficulty at IE not tested     Lumbar active range of motion in standing is: 11-12-20  - flexion - to near toes                     - extension -  50%                         - left side bending -  To knee         - right side bending -  To knee  Slight pain               Lumbar active range of motion in standing is: initial eval  - flexion - mid thigh                     - extension -  25%                          - left side bending -  Mid thigh         - right side bending -  Mid thigh   Pain all planes           Pelvic positioning: pt continues to develop dysfunction improving in performing ex for stability at IE AR R      Flexibility testing:  - hamstrings:     90/90 test same as IE R 20 L 30           - gastrocnemius:   DF ankle R 5 degrees L 5 degrees     Muscle Strength 11-12-20  MMT R L   Hip flexion 4-/5 4/5   Hip abduction 4-/5 4-/5   Hip extension 4-/5 4-/5   Knee extension 5/5 5/5   Knee flexion 5/5 5/5         Muscle Strength Pt with acute pain with active motion of L LE so deferred further MMT Initial eval  MMT R L   Hip flexion 3+/5 3/5   Knee extension 5/5 5/5   Knee flexion        Pelvic positioning: AR R      Flexibility testing:  - hamstrings:     90/90 test same as IE  R 20 L 30           - gastrocnemius:   DF ankle R 5 degrees L 5 degrees    Joint mobility: mod-severe tightness with spring test, not tested at IE          CMS Impairment/Limitation/Restriction for FOTO lumbar Survey     Therapist reviewed FOTO scores for Lilli Stringer    FOTO documents entered into [employer] - see Media section.     Limitation Score: 44% at IE 46%           TREATMENT    Portia received therapeutic exercises to develop strength, endurance, ROM, flexibility and core stabilization for 35 minutes including:     Pt with pelvic dysfunction.  Pt performed push/pull exercise and unable to note positive change in symptoms.  Instructed pt to perform push/pull every 2 hours  Discussed pt progression and how to focus on progress     HSS supine x 10  Piriformis stretch x 10  Bridge x 2/10, required cueing for proper performance and recruitment   Pelvic tilt x 2/10 - increased difficulty performing, tendency to push through legs and gluts not abdominals   Pelvic tilt with march x 10  SLR x 10  Clam sidelying x 10  Hip abd sidelying x 10  Modified push/pull x 3        Portia received the following manual therapy  techniques: Dry needling were applied to the: R gluteals/piriformis for 10 minutes, including:    Patient  gave verbal consent to undergo dry needling. Dry needling with trigger point/manual therapy techniques was performed to R piriformis.  All needles were removed and changes in signs and symptoms were noted.  Dry needling was performed to decrease inflammation, increase circulation, decrease pain and restore homeostasis.      Portia received the following direct contact modalities after being cleared for contraindications: Ultrasound:  Lilli received ultrasound to manage pain and inflammation at 100 % duty cycle applied to the R piriformis 1 Mhz at an intensity of 1.5  W/cm2  for a duration of 8 minutes + 2 min setup/cleanup. Patient tolerated treatment well without adverse effects.   Pt reports relief with US.     Home Exercises Provided and Patient Education Provided     Education provided:   - HEP  - Instructed pt further in increased awareness of symptoms.  Instructed pt in push/pull at onset of increased symptoms.    -     Written Home Exercises Provided: yes  Exercises were reviewed and Portia was able to demonstrate them prior to the end of the session.  Portia demonstrated good  understanding of the education provided.     See EMR under Patient Instructions for exercises provided at initial eval and 10/1/2020, 10-22-20, 11-3-20, 11-12-20.    Assessment   Patient demonstrates improvement in range of motion, strength, stabilization and function.  Pt is improving in speed and walking with a more normal gait, but still slow speed and slightly cautious.  Pt making slow progression with strength though still needs much cueing for proper performance and recruitment with therex.  Pt is still limited with job duties of prolonged walking, standing and lifting and will benefit from continued treatment to assist her in fully achieving her goals.      Portia is progressing well towards her goals.   Pt prognosis is Good.      Pt will continue to benefit from skilled outpatient physical therapy to address the deficits listed in the problem list box on initial evaluation, provide pt/family education and to maximize pt's level of independence in the home and community environment.     Pt's spiritual, cultural and educational needs considered and pt agreeable to plan of care and goals.     Anticipated barriers to physical therapy: none    GOALS:   Short Term Goals:  2 weeks   Increase range of motion 25% MET  Increase strength 1/2 muscle grade MET  Improve postural awareness of pelvis to independently identify dysfunction with min assist from PT MET  Be able to perform HEP with minimal cueing required Progressing, not met     Long Term Goals: 30 days Progressing, not met LTG's  Increase range of motion to 75% to 100% full   Improve muscle strength 1 muscle grade  Improve and stabilize proper pelvic positioning  Restore ability to ambulate with normal pain free gait  Walking for ADL and exercise will be restored without increased pain  Restore ability to stand for ADL without increased pain  Restore ability to lift and carry items such as groceries without increased pain  Restore ability to perform ADL's and household activities independently and without increased pain       Plan   Continue with current POC toward established therapy goals.    Corine Andre, PT

## 2020-11-16 ENCOUNTER — TELEPHONE (OUTPATIENT)
Dept: SPINE | Facility: CLINIC | Age: 75
End: 2020-11-16

## 2020-11-16 DIAGNOSIS — M54.42 ACUTE LEFT-SIDED LOW BACK PAIN WITH LEFT-SIDED SCIATICA: Primary | ICD-10-CM

## 2020-11-16 NOTE — TELEPHONE ENCOUNTER
I have placed a new order for PT.    Maged -   I spoke with patient and since workers comp is denying further PT.  She wants it to be filed through her peoples health insurance.  Can you assist with this?    Alysha

## 2020-11-16 NOTE — TELEPHONE ENCOUNTER
----- Message from Aimee Metzger sent at 11/16/2020  3:11 PM CST -----  Regarding: Advice  Contact: patient  Type: Needs Medical Advice  Who Called:  patient  Symptoms (please be specific):  n/a  How long has patient had these symptoms:  n/a  Pharmacy name and phone #:  n/a  Best Call Back Number: [phone] (home)     Additional Information: asking for Alysha Vang to give a call when she gets a chance.

## 2020-11-16 NOTE — TELEPHONE ENCOUNTER
Order has been faxed to PT and the authorization department has been contacted regarding authorizing.

## 2020-11-19 ENCOUNTER — DOCUMENTATION ONLY (OUTPATIENT)
Dept: REHABILITATION | Facility: HOSPITAL | Age: 75
End: 2020-11-19

## 2020-11-19 DIAGNOSIS — G89.29 CHRONIC BILATERAL LOW BACK PAIN WITH LEFT-SIDED SCIATICA: Primary | ICD-10-CM

## 2020-11-19 DIAGNOSIS — M54.42 CHRONIC BILATERAL LOW BACK PAIN WITH LEFT-SIDED SCIATICA: Primary | ICD-10-CM

## 2020-11-19 DIAGNOSIS — M62.81 MUSCLE WEAKNESS: ICD-10-CM

## 2020-11-19 NOTE — PROGRESS NOTES
Physical Therapy Discharge and  Daily Treatment Note      Name: Lilli Foote [place]  Clinic Number: [id number]     Therapy Diagnosis:        Encounter Diagnoses   Name Primary?    Chronic bilateral low back pain with left-sided sciatica Yes    Muscle weakness        Physician: Alysha Vang PA-C     Visit Date: 11/12/2020     Physician Orders: PT Eval and Treat 2 x week 30 days  Medical Diagnosis from Referral: M54.42 (ICD-10-CM) - Acute left-sided low back pain with left-sided sciatica  Evaluation Date: 9/29/2020  Authorization Period Expiration: 9-11-21  Plan of Care Expiration: 12-3-20  Visit # / Visits authorized: 12 / 12  MD Follow up appointment: 11-6-20    CANCELLATIONS:  1  NO SHOWS: 0     Precautions: Standard     Subjective      Pt reports: feeling better overall    Response to previous treatment: did ok  Functional change: walking better but not all the time     Pain: 2.5/10 to start at worst 3.5 at best 1/10     Initial eval: Current 4/10, worst 9/10, best 4/10   Location: bilateral LB and R leg      Objective   PATIENT WAS DENIED FURTHER COVERAGE FROM HER WORKER'S COMPENSATION INSURANCE       Functional assessment:   - walking:  Improved gait still slow, but not as slow as previously. On 11-5-20 Walking slowly and cautiously without antalgia at IE antalgic L  - sit to stand: very min difficulty at IE min difficulty  - sit to supine:very min difficulty at IE  min difficulty       - supine to sit: very min difficulty at IE  mod difficulty, had to work with pt to log roll to transfer   - supine to prone: able to perform with very min difficulty on 11-5-20 min difficulty at IE not tested     Lumbar active range of motion in standing is: 11-12-20  - flexion - to near toes                     - extension -  50%                         - left side bending -  To knee         - right side bending -  To knee  Slight pain               Lumbar active range of motion in standing is: initial eval  - flexion  - mid thigh                     - extension -  25%                         - left side bending -  Mid thigh         - right side bending -  Mid thigh   Pain all planes           Pelvic positioning: pt continues to develop dysfunction improving in performing ex for stability at IE AR R      Flexibility testing:  - hamstrings:     90/90 test same as IE R 20 L 30           - gastrocnemius:   DF ankle R 5 degrees L 5 degrees     Muscle Strength 11-12-20  MMT R L   Hip flexion 4-/5 4/5   Hip abduction 4-/5 4-/5   Hip extension 4-/5 4-/5   Knee extension 5/5 5/5   Knee flexion 5/5 5/5         Muscle Strength Pt with acute pain with active motion of L LE so deferred further MMT Initial eval  MMT R L   Hip flexion 3+/5 3/5   Knee extension 5/5 5/5   Knee flexion           Pelvic positioning: AR R      Flexibility testing:  - hamstrings:     90/90 test same as IE  R 20 L 30           - gastrocnemius:   DF ankle R 5 degrees L 5 degrees     Joint mobility: mod-severe tightness with spring test, not tested at IE          CMS Impairment/Limitation/Restriction for FOTO lumbar Survey     Therapist reviewed FOTO scores for Lilli Foote [place]    FOTO documents entered into [employer] - see Media section.     Limitation Score: 44% at IE 46%            TREATMENT     Portia received therapeutic exercises to develop strength, endurance, ROM, flexibility and core stabilization for 35 minutes including:      Pt with pelvic dysfunction.  Pt performed push/pull exercise and unable to note positive change in symptoms.  Instructed pt to perform push/pull every 2 hours  Discussed pt progression and how to focus on progress      HSS supine x 10  Piriformis stretch x 10  Bridge x 2/10, required cueing for proper performance and recruitment   Pelvic tilt x 2/10 - increased difficulty performing, tendency to push through legs and gluts not abdominals              Pelvic tilt with march x 10  SLR x 10  Clam sidelying x 10  Hip abd sidelying x  10  Modified push/pull x 3           Portia received the following manual therapy techniques: Dry needling were applied to the: R gluteals/piriformis for 10 minutes, including:    Patient  gave verbal consent to undergo dry needling. Dry needling with trigger point/manual therapy techniques was performed to R piriformis.  All needles were removed and changes in signs and symptoms were noted.  Dry needling was performed to decrease inflammation, increase circulation, decrease pain and restore homeostasis.       Portia received the following direct contact modalities after being cleared for contraindications: Ultrasound:  Lilli received ultrasound to manage pain and inflammation at 100 % duty cycle applied to the R piriformis 1 Mhz at an intensity of 1.5  W/cm2  for a duration of 8 minutes + 2 min setup/cleanup. Patient tolerated treatment well without adverse effects.   Pt reports relief with US.      Home Exercises Provided and Patient Education Provided      Education provided:   - HEP  - Instructed pt further in increased awareness of symptoms.  Instructed pt in push/pull at onset of increased symptoms.    -      Written Home Exercises Provided: yes  Exercises were reviewed and Portia was able to demonstrate them prior to the end of the session.  Portia demonstrated good  understanding of the education provided.      See EMR under Patient Instructions for exercises provided at initial eval and 10/1/2020, 10-22-20, 11-3-20, 11-12-20.     Assessment   Patient demonstrates improvement in range of motion, strength, stabilization and function.  Pt is improving in speed and walking with a more normal gait, but still slow speed and slightly cautious.  Pt making slow progression with strength though still needs much cueing for proper performance and recruitment with therex.  Pt is still limited with job duties of prolonged walking, standing and lifting and will benefit from continued treatment to assist her in fully achieving  her goals.       Portia is progressing well towards her goals.   Pt prognosis is Good.      Pt will continue to benefit from skilled outpatient physical therapy to address the deficits listed in the problem list box on initial evaluation, provide pt/family education and to maximize pt's level of independence in the home and community environment.      Pt's spiritual, cultural and educational needs considered and pt agreeable to plan of care and goals.     Anticipated barriers to physical therapy: none     GOALS:   Short Term Goals:  2 weeks   Increase range of motion 25% MET  Increase strength 1/2 muscle grade MET  Improve postural awareness of pelvis to independently identify dysfunction with min assist from PT MET  Be able to perform HEP with minimal cueing required Progressing, not met     Long Term Goals: 30 days Progressing, not met LTG's  Increase range of motion to 75% to 100% full   Improve muscle strength 1 muscle grade  Improve and stabilize proper pelvic positioning  Restore ability to ambulate with normal pain free gait  Walking for ADL and exercise will be restored without increased pain  Restore ability to stand for ADL without increased pain  Restore ability to lift and carry items such as groceries without increased pain  Restore ability to perform ADL's and household activities independently and without increased pain        Plan   Patient is discharged from physical therapy after partially achieving the established goals.  Thank you for allowing us to assist in the care of your patient.    Corine Andre, MS, PT

## 2020-11-24 ENCOUNTER — CLINICAL SUPPORT (OUTPATIENT)
Dept: REHABILITATION | Facility: HOSPITAL | Age: 75
End: 2020-11-24
Payer: MEDICARE

## 2020-11-24 DIAGNOSIS — M54.42 CHRONIC BILATERAL LOW BACK PAIN WITH LEFT-SIDED SCIATICA: ICD-10-CM

## 2020-11-24 DIAGNOSIS — M25.551 RIGHT HIP PAIN: Primary | ICD-10-CM

## 2020-11-24 DIAGNOSIS — M54.42 ACUTE LEFT-SIDED LOW BACK PAIN WITH LEFT-SIDED SCIATICA: ICD-10-CM

## 2020-11-24 DIAGNOSIS — M62.81 MUSCLE WEAKNESS: ICD-10-CM

## 2020-11-24 DIAGNOSIS — G89.29 CHRONIC BILATERAL LOW BACK PAIN WITH LEFT-SIDED SCIATICA: ICD-10-CM

## 2020-11-24 PROCEDURE — 97110 THERAPEUTIC EXERCISES: CPT | Mod: PN | Performed by: PHYSICAL THERAPIST

## 2020-11-24 PROCEDURE — 97164 PT RE-EVAL EST PLAN CARE: CPT | Mod: PN | Performed by: PHYSICAL THERAPIST

## 2020-11-24 NOTE — PLAN OF CARE
[facility]  Physical Therapy Re- Evaluation    Name: Lilli Foote [place]  Clinic Number: [id number]    Therapy Diagnosis:   Encounter Diagnoses   Name Primary?    Acute left-sided low back pain with left-sided sciatica     Right hip pain Yes    Muscle weakness     Chronic bilateral low back pain with left-sided sciatica      Physician: Alysha Vang PA-C    Physician Orders: PT Eval and Treat 2 times a week for 30 days  Medical Diagnosis from Referral: M54.42 (ICD-10-CM) - Acute left-sided low back pain with left-sided sciatica  Evaluation Date: 11/24/2020  Authorization Period Expiration: 12-8-20  Plan of Care Expiration: 12-24-20  Visit # / Visits authorized: 1/ 6  MD Follow up appointment: none scheduled    Time In: 12:22 pt late to back completing paperwork  Time Out: 1:03  Total Billable Time: 41 minutes    Precautions: Standard    Subjective   Date of onset: 9-5-20  History of current condition - 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  Pt went to Urgent care and did x-ray and medication and was not going away. Went to another MD and had MRI and different meds and was told she had mild arthritis Pt states still on muscle relaxer and Aleve Pt states across LB initially and now has progressed to L hip and down lateral thigh and sometimes to mid calf like a dull ache sometimes she gets sharp pain    Currently she reports she has been working on her exercises daily, but she was feeling better and did not do exercises at often.  Started to walk a few blocks and not using heat and ice as previously.  Pt states she knows the problem is still there and she is walking better.  Pt states still taking medication which she feels helps      Medical History:   Past Medical History:   Diagnosis Date    Allergy     Endometriosis of uterus        Surgical History:    Lilli Stringer  has a past surgical history that includes Tubal ligation; Breast surgery (1980s); and Dilation and curettage of uterus.    Medications:   Lilli has a current medication list which includes the following prescription(s): fluticasone propionate, methocarbamol, naproxen sodium, and prednisone.    Allergies:   Review of patient's allergies indicates:   Allergen Reactions    Iodine and iodide containing products     Sulfa (sulfonamide antibiotics)         Imaging, x-ray: Alignment is normal.  There is mild DJD.  No fracture dislocation bone destruction seen.  No trauma seen.  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     Pts goals: stay at level 1/10 pain    Objective     Postural examination in standing:  - normal lumbar lordosis  - forward head  - forward shoulders  - R hip high  - L shoulder high    Postural examination in sitting:   - normal lumbar lordosis  - forward head  - forward shoulders      Functional assessment:   - walking: slow and cautious   - sit to stand: min use of hands  - sit to supine: very min difficulty       - supine to sit: very min difficulty  - supine to prone: very min difficulty    Lumbar active range of motion in standing is:  - flexion - to toes                     - extension -  50%                         - left side bending -  Mid thigh  little pain          - right side bending -  Mid thigh pain          Pelvic positioning: AR R     Flexibility testing:  - hamstrings:     90/90 test R 25 L 35           - gastrocnemius:   DF ankle R 5 degrees L 5 degrees      Muscle Strength  MMT R L   Hip flexion 4-/5 4/5   Hip abduction 4-/5 4-/5   Hip extension 4-/5 4-/5   Glut max 3/5 3/5        Knee extension 5/5 5/5   Knee flexion 5/5 5/5       Endurance is  fair    Lumbar Special tests:  SLR neg    Palpation: mildly TTP over R SI    Joint mobility: not tested    Sensation: Intact    CMS Impairment/Limitation/Restriction for FOTO lumbar Survey    Therapist reviewed FOTO scores for Lilli Stringer on 11/24/2020.   FOTO documents entered into [employer] - see Media section.    Limitation Score: 43%       TREATMENT   Treatment Time In: 12:48  Treatment Time Out: 1:03  Total Treatment time separate from Evaluation: 10 minutes    Portia received therapeutic exercises to develop strength, endurance, ROM, flexibility and core stabilization for 10 minutes including:  Pt with pelvic dysfunction.  Pt had not recently performed push/pull ex.  Pt performed push/pull exercise and able to note positive change in symptoms.  Instructed pt further in increased awareness of symptoms.  Instructed pt again in need to perform push/pull at onset of increased symptoms.      HS stretch x 10  Piriformis stretch x 10  Bridge x 10  Pelvic tilt with march x 10  Verbally reviewed the ex below due to time constraints and did perform modified push/pull  SLR x 10  Clam SL x 10   Hip abduction sidelying x 10  ModifiedPush/pull x 3    At end of treatment 1/10    Reprinted whole program to allow better flow with ex for pt    Home Exercises and Patient Education Provided    Education provided:   - HEP  - Instructed pt in increased awareness of symptoms.  Instructed pt in push/pull at onset of increased symptoms.    - pain free zone with therex    Written Home Exercises Provided: Yes    Exercises were reviewed and Portia was able to demonstrate them prior to the end of the session.  Portia demonstrated good  understanding of the education provided.     See EMR under Patient Instructions for exercises provided 11/24/2020.    Assessment   Lilli is a 74 y.o. female referred to outpatient Physical Therapy with a medical diagnosis of M54.42 (ICD-10-CM) - Acute left-sided low back pain with left-sided sciatica. Pt presents with R hip pain with pelvic dysfunction with loss of ROM and strength.  Pt continues to have difficulty with proper performance of HEP and will need continued work with visual and tactile cueing to assist pt in achieving her goals.      Pt prognosis is Good.   Pt will benefit from skilled outpatient Physical Therapy to address the deficits stated above and in the chart below, provide pt/family education, and to maximize pt's level of independence.     Plan of care discussed with patient: Yes  Pt's spiritual, cultural and educational needs considered and patient is agreeable to the plan of care and goals as stated below:     Anticipated Barriers for therapy: none    GOALS:   Short Term Goals:  2 weeks  Increase range of motion 25%  Increase strength 1/2 muscle grade  Improve postural awareness of pelvis to independently identify dysfunction with min assist from PT  Be able to perform HEP with minimal cueing required    Long Term Goals: 30 days  Increase range of motion to 75% to 100% full   Improve muscle strength 1 muscle grade  Improve and stabilize proper pelvic positioning  Restore ability to ambulate with normal pain free gait  Walking for ADL and exercise will be restored without increased pain  Restore ability to stand for ADL without increased pain  Restore ability to perform sit to stand transfer without increased pain  Restore normal sleep habits without disturbances due to pain  Restore ability to perform ADL's and household activities independently and without increased  pain    Plan   Plan of care Certification: 11/24/2020 to 12-24-20.    Outpatient Physical Therapy 2 times weekly for 30 days  to include the following interventions: Therapeutic exercises, manual therapy, neuromuscular re-education, therapeutic activities, modalities such as moist heat, ice, ultrasound and electrical stimulation, lumbar mechanical traction and dry needling will be considered and utilized as needed    Corine Andre, PT     I certify the need for these services furnished under this plan of treatment and while under my care.    ____________________________________ Physician/Referring Practitioner                                  Date of Signature

## 2020-12-03 ENCOUNTER — CLINICAL SUPPORT (OUTPATIENT)
Dept: REHABILITATION | Facility: HOSPITAL | Age: 75
End: 2020-12-03
Payer: MEDICARE

## 2020-12-03 DIAGNOSIS — M62.81 MUSCLE WEAKNESS: ICD-10-CM

## 2020-12-03 DIAGNOSIS — M25.551 RIGHT HIP PAIN: ICD-10-CM

## 2020-12-03 DIAGNOSIS — M54.42 CHRONIC BILATERAL LOW BACK PAIN WITH LEFT-SIDED SCIATICA: ICD-10-CM

## 2020-12-03 DIAGNOSIS — G89.29 CHRONIC BILATERAL LOW BACK PAIN WITH LEFT-SIDED SCIATICA: ICD-10-CM

## 2020-12-03 PROCEDURE — 97110 THERAPEUTIC EXERCISES: CPT | Mod: PN | Performed by: PHYSICAL THERAPIST

## 2020-12-03 NOTE — PROGRESS NOTES
Physical Therapy Daily Treatment Note     Name: Lilli Foote [place]  Clinic Number: [id number]    Therapy Diagnosis:   Encounter Diagnoses   Name Primary?    Right hip pain     Chronic bilateral low back pain with left-sided sciatica     Muscle weakness      Physician: Alysha Vang PA-C    Visit Date: 12/3/2020    Physician Orders: PT Eval and Treat 2 times a week for 30 days  Medical Diagnosis from Referral: M54.42 (ICD-10-CM) - Acute left-sided low back pain with left-sided sciatica  Evaluation Date: 11/24/2020  Authorization Period Expiration: 12-8-20  Plan of Care Expiration: 12-24-20  Visit # / Visits authorized: 2/ 6  MD Follow up appointment: none scheduled     Time In: 12:32  Time Out: 1:17  Total Billable Time: 45 minutes    Precautions: Standard    Subjective     Pt reports: she feels she is getting much better Pt states she feels she is gradually improving.   Pt states when she felt good she did not do any exercises and went to grocery store and  bags and walk for exercises.  Pt states she feels normal and has not needed to do push/pull Pt states she has done ex a couple of times but has not needed to do exercises for feel I can do what I need and want to do.    Pt was somewhat  compliant with home exercise program.  Response to previous treatment: felt ok  Functional change: able to function without pain with improved walking and able to go grocery shopping    Pain: 0/10  Location: right hip and LB     Objective     Lumbar active range of motion in standing is: 12-3-20  - flexion - to toes                     - extension -  100%                         - left side bending -  To knee         - right side bending -  To knee            Lumbar active range of motion in standing is: initial eval  - flexion - to toes                     - extension -  50%                         - left side bending -  Mid thigh little pain          - right side bending -  Mid thigh pain           Pelvic  positioning: level pelvis at IE AR R     Flexibility testing:  - hamstrings:     90/90 test R 10 L 10 at IE  R 25 L 35           - gastrocnemius:   DF ankle R 5 degrees L 5 degrees    Muscle Strength 12-3-20  MMT R L   Hip flexion 5/5 5/5   Hip abduction 5/5 5/5   Hip extension 4/5 4/5   Glut max 3+/5 3+/5        Knee extension 5/5 5/5   Knee flexion 5/5 5/5         Muscle Strength initial eval  MMT R L   Hip flexion 4-/5 4/5   Hip abduction 4-/5 4-/5   Hip extension 4-/5 4-/5   Glut max 3/5 3/5           Knee extension 5/5 5/5   Knee flexion 5/5 5/5        TREATMENT    Portia received therapeutic exercises to develop strength, endurance, ROM, flexibility and core stabilization for 45 minutes including:  HS stretch x 10  Piriformis stretch x 10  Bridge x 10  Pelvic tilt with march x 10,   SLR x 10  Clam SL x 10   Hip abduction sidelying x 10   Hip ext with pillow under stomach x 10   Hip ext with bent knee with pillow under stomach x 10  ModifiedPush/pull x 3    Verbal and tactile cueing provided for proper performance and recruitment.    Home Exercises Provided and Patient Education Provided     Education provided:   - HEP and need for strengthening sean with gluts    Written Home Exercises Provided: yes.  Exercises were reviewed and Portia was able to demonstrate them prior to the end of the session.  Portia demonstrated good  understanding of the education provided.     See EMR under Patient Instructions for exercises provided at initial eval and 12/3/2020.    Assessment     Pt has made significant progress with no pain and has been able to stay stable without doing push/pull very often and progressing with ability to walk without pain and do activities such as grocery shopping without increased pain.  Pt is improved since last visit but continues with glut weakness and will benefit from continued work on HEP and progression with hip strengthening Pt does continue to require some verbal and tactile cueing for proper  performance and recruitment,  Pt ar treatment well with no increase in symptoms with ex.  Due to improvement we will decrease frequency to 1 time next week and if continues to do well may see only one more visit prior to end of POC based on pt symptoms.   Pt understands to call if exacerbation of symptoms occurs.     Portia is progressing well towards her goals.   Pt prognosis is Good.     Pt will continue to benefit from skilled outpatient physical therapy to address the deficits listed in the problem list box on initial evaluation, provide pt/family education and to maximize pt's level of independence in the home and community environment.     Pt's spiritual, cultural and educational needs considered and pt agreeable to plan of care and goals.     Anticipated barriers to physical therapy: none    GOALS:   Short Term Goals:  2 weeks MET STG's  Increase range of motion 25%  Increase strength 1/2 muscle grade  Improve postural awareness of pelvis to independently identify dysfunction with min assist from PT  Be able to perform HEP with minimal cueing required     Long Term Goals: 30 days (Progressing, not met)  Increase range of motion to 75% to 100% full   Improve muscle strength 1 muscle grade  Improve and stabilize proper pelvic positioning  Restore ability to ambulate with normal pain free gait  Walking for ADL and exercise will be restored without increased pain  Restore ability to stand for ADL without increased pain  Restore ability to perform sit to stand transfer without increased pain  Restore normal sleep habits without disturbances due to pain  Restore ability to perform ADL's and household activities independently and without increased pain    Plan   Continue with established plan of care towards PT goals.      See in 1 week and progress as tolerated.  Wean off PT as able and return sooner if exacerbation occurs.     Corine Andre, PT

## 2020-12-10 ENCOUNTER — CLINICAL SUPPORT (OUTPATIENT)
Dept: REHABILITATION | Facility: HOSPITAL | Age: 75
End: 2020-12-10
Payer: MEDICARE

## 2020-12-10 ENCOUNTER — TELEPHONE (OUTPATIENT)
Dept: SPINE | Facility: CLINIC | Age: 75
End: 2020-12-10

## 2020-12-10 DIAGNOSIS — G89.29 CHRONIC BILATERAL LOW BACK PAIN WITH LEFT-SIDED SCIATICA: ICD-10-CM

## 2020-12-10 DIAGNOSIS — M62.81 MUSCLE WEAKNESS: ICD-10-CM

## 2020-12-10 DIAGNOSIS — M25.551 RIGHT HIP PAIN: ICD-10-CM

## 2020-12-10 DIAGNOSIS — M54.42 CHRONIC BILATERAL LOW BACK PAIN WITH LEFT-SIDED SCIATICA: ICD-10-CM

## 2020-12-10 PROCEDURE — 97110 THERAPEUTIC EXERCISES: CPT | Mod: PN | Performed by: PHYSICAL THERAPIST

## 2020-12-10 NOTE — TELEPHONE ENCOUNTER
I have already addressed this with Noel.    She is a follow up as needed if no improvement with PT.

## 2020-12-10 NOTE — TELEPHONE ENCOUNTER
----- Message from Chester Sanders LPN sent at 12/10/2020  1:00 PM CST -----  The nurse , Geneva Madison, emailed me regarding this patients follow up visit.  I didn't see one scheduled.  Does she need to follow up and if so, when should she?    Thanks,  Noel

## 2020-12-10 NOTE — PROGRESS NOTES
Physical Therapy Progress and Daily Treatment Note     Name: Lilli Foote [place]  Clinic Number: [id number]    Therapy Diagnosis:   Encounter Diagnoses   Name Primary?    Right hip pain     Chronic bilateral low back pain with left-sided sciatica     Muscle weakness      Physician: Alysha Vang PA-C    Visit Date: 12/10/2020    Physician Orders: PT Eval and Treat 2 times a week for 30 days  Medical Diagnosis from Referral: M54.42 (ICD-10-CM) - Acute left-sided low back pain with left-sided sciatica  Evaluation Date: 11/24/2020  Authorization Period Expiration: [id number]  Plan of Care Expiration: 12-24-20  Visit # / Visits authorized: 3/ 6  MD Follow up appointment: none scheduled     Time In: 9:34  Time Out: 10:20  Total Billable Time: 44 minutes    Precautions: Standard    Subjective     Pt reports: she is feeling 99% improved Pt has been active and walking and feeling good. Pt states she has not been doing exercises for she did not think she needed them  Pt was somewhat  compliant with home exercise program.  Response to previous treatment: felt ok  Functional change: able to function without pain with improved walking and able to go grocery shopping    Pain: 0/10, at worst 1 after lifting bags of groceries  Initial eval: Current 2/10, worst 5/10, best 1/10   Location: right hip and LB     Objective     Lumbar active range of motion in standing is: 12-10-20  - flexion - to toes                     - extension -  100%                         - left side bending -  To knee         - right side bending -  To knee            Lumbar active range of motion in standing is: initial eval  - flexion - to toes                     - extension -  50%                         - left side bending -  Mid thigh little pain          - right side bending -  Mid thigh pain           Pelvic positioning: level pelvis at IE AR R     Flexibility testing:  - hamstrings:     90/90 test R 10 L 10 at IE  R 25 L 35           -  gastrocnemius:   DF ankle R 5 degrees L 5 degrees    Muscle Strength 12-10-20  MMT R L   Hip flexion 5/5 5/5   Hip abduction 5/5 5/5   Hip extension 4-/5 4-/5   Glut max 3/5 3/5        Knee extension 5/5 5/5   Knee flexion 5/5 5/5         Muscle Strength initial eval  MMT R L   Hip flexion 4-/5 4/5   Hip abduction 4-/5 4-/5   Hip extension 4-/5 4-/5   Glut max 3/5 3/5           Knee extension 5/5 5/5   Knee flexion 5/5 5/5        TREATMENT    Portia received therapeutic exercises to develop strength, endurance, ROM, flexibility and core stabilization for 44 minutes including:  HOLD HS stretch x 10  HOLD Piriformis stretch x 10  Bridge x 10  HOLDPelvic tilt with march x 10,   HOLD SLR x 10  HOLDClam SL x 10   HOLDHip abduction sidelying x 10  Hip ext with pillow under stomach x 10  Hip ext with bent knee with pillow under stomach x 10  ModifiedPush/pull x 3    Verbal and tactile cueing provided for proper performance and recruitment.    Pt had her HEP with her and instructed her in prioritization of HEP with glut strengthening but also encouraged performance of other ex for Wellness     Home Exercises Provided and Patient Education Provided     Education provided:   - HEP and need for strengthening sean with gluts  - need for isolated exercise even if walking for exercise    Written Home Exercises Provided:no reviewed current program  Exercises were reviewed and Portia was able to demonstrate them prior to the end of the session.  Portia demonstrated good  understanding of the education provided.     See EMR under Patient Instructions for exercises provided at initial eval and 12/3/2020.    Assessment     Pt continues to do very well functionally with no pain and has not needed to do push/pull.  Pt was noted to have slight decline in glut strength from previous measurements so encouraged pt to perform at least glut strengthening.  Pt appears to understand role of exercise even though active and walks for exercise.  Pt's POC  expires 12-24-20.  We will keep file open through this time in case pt experiences an exacerbation of symptoms.  After this time we will plan to discharge patient assuming she has continued to do well.      Portia is progressing well towards her goals.   Pt prognosis is Good.     Pt will continue to benefit from skilled outpatient physical therapy to address the deficits listed in the problem list box on initial evaluation, provide pt/family education and to maximize pt's level of independence in the home and community environment.     Pt's spiritual, cultural and educational needs considered and pt agreeable to plan of care and goals.     Anticipated barriers to physical therapy: none    GOALS:   Short Term Goals:  2 weeks MET STG's  Increase range of motion 25%  Increase strength 1/2 muscle grade  Improve postural awareness of pelvis to independently identify dysfunction with min assist from PT  Be able to perform HEP with minimal cueing required     Long Term Goals: 30 days   Increase range of motion to 75% to 100% full MET  Improve muscle strength 1 muscle grade (Progressing, not met)  Improve and stabilize proper pelvic positioning MET  Restore ability to ambulate with normal pain free gaitMET  Walking for ADL and exercise will be restored without increased pain MET  Restore ability to stand for ADL without increased painMET   Restore ability to perform sit to stand transfer without increased pain MET  Restore normal sleep habits without disturbances due to pain MET  Restore ability to perform ADL's and household activities independently and without increased pain MET    Plan   See PRN through 12-24-20 and then discharge to HEP      Corine Andre, PT

## 2020-12-10 NOTE — PLAN OF CARE
Physical Therapy Progress and Daily Treatment Note     Name: Lilli Foote [place]  Clinic Number: [id number]    Therapy Diagnosis:   Encounter Diagnoses   Name Primary?    Right hip pain     Chronic bilateral low back pain with left-sided sciatica     Muscle weakness      Physician: Alysha Vang PA-C    Visit Date: 12/10/2020    Physician Orders: PT Eval and Treat 2 times a week for 30 days  Medical Diagnosis from Referral: M54.42 (ICD-10-CM) - Acute left-sided low back pain with left-sided sciatica  Evaluation Date: 11/24/2020  Authorization Period Expiration: [id number]  Plan of Care Expiration: 12-24-20  Visit # / Visits authorized: 3/ 6  MD Follow up appointment: none scheduled     Time In: 9:34  Time Out: 10:20  Total Billable Time: 44 minutes    Precautions: Standard    Subjective     Pt reports: she is feeling 99% improved Pt has been active and walking and feeling good. Pt states she has not been doing exercises for she did not think she needed them  Pt was somewhat  compliant with home exercise program.  Response to previous treatment: felt ok  Functional change: able to function without pain with improved walking and able to go grocery shopping    Pain: 0/10, at worst 1 after lifting bags of groceries  Initial eval: Current 2/10, worst 5/10, best 1/10   Location: right hip and LB     Objective     Lumbar active range of motion in standing is: 12-10-20  - flexion - to toes                     - extension -  100%                         - left side bending -  To knee         - right side bending -  To knee            Lumbar active range of motion in standing is: initial eval  - flexion - to toes                     - extension -  50%                         - left side bending -  Mid thigh little pain          - right side bending -  Mid thigh pain           Pelvic positioning: level pelvis at IE AR R     Flexibility testing:  - hamstrings:     90/90 test R 10 L 10 at IE  R 25 L 35           -  gastrocnemius:   DF ankle R 5 degrees L 5 degrees    Muscle Strength 12-10-20  MMT R L   Hip flexion 5/5 5/5   Hip abduction 5/5 5/5   Hip extension 4-/5 4-/5   Glut max 3/5 3/5        Knee extension 5/5 5/5   Knee flexion 5/5 5/5         Muscle Strength initial eval  MMT R L   Hip flexion 4-/5 4/5   Hip abduction 4-/5 4-/5   Hip extension 4-/5 4-/5   Glut max 3/5 3/5           Knee extension 5/5 5/5   Knee flexion 5/5 5/5        TREATMENT    Portia received therapeutic exercises to develop strength, endurance, ROM, flexibility and core stabilization for 44 minutes including:  HOLD HS stretch x 10  HOLD Piriformis stretch x 10  Bridge x 10  HOLDPelvic tilt with march x 10,   HOLD SLR x 10  HOLDClam SL x 10   HOLDHip abduction sidelying x 10  Hip ext with pillow under stomach x 10  Hip ext with bent knee with pillow under stomach x 10  ModifiedPush/pull x 3    Verbal and tactile cueing provided for proper performance and recruitment.    Pt had her HEP with her and instructed her in prioritization of HEP with glut strengthening but also encouraged performance of other ex for Wellness     Home Exercises Provided and Patient Education Provided     Education provided:   - HEP and need for strengthening sean with gluts  - need for isolated exercise even if walking for exercise    Written Home Exercises Provided:no reviewed current program  Exercises were reviewed and Portia was able to demonstrate them prior to the end of the session.  Portia demonstrated good  understanding of the education provided.     See EMR under Patient Instructions for exercises provided at initial eval and 12/3/2020.    Assessment     Pt continues to do very well functionally with no pain and has not needed to do push/pull.  Pt was noted to have slight decline in glut strength from previous measurements so encouraged pt to perform at least glut strengthening.  Pt appears to understand role of exercise even though active and walks for exercise.  Pt's POC  expires 12-24-20.  We will keep file open through this time in case pt experiences an exacerbation of symptoms.  After this time we will plan to discharge patient assuming she has continued to do well.      Portia is progressing well towards her goals.   Pt prognosis is Good.     Pt will continue to benefit from skilled outpatient physical therapy to address the deficits listed in the problem list box on initial evaluation, provide pt/family education and to maximize pt's level of independence in the home and community environment.     Pt's spiritual, cultural and educational needs considered and pt agreeable to plan of care and goals.     Anticipated barriers to physical therapy: none    GOALS:   Short Term Goals:  2 weeks MET STG's  Increase range of motion 25%  Increase strength 1/2 muscle grade  Improve postural awareness of pelvis to independently identify dysfunction with min assist from PT  Be able to perform HEP with minimal cueing required     Long Term Goals: 30 days   Increase range of motion to 75% to 100% full MET  Improve muscle strength 1 muscle grade (Progressing, not met)  Improve and stabilize proper pelvic positioning MET  Restore ability to ambulate with normal pain free gaitMET  Walking for ADL and exercise will be restored without increased pain MET  Restore ability to stand for ADL without increased painMET   Restore ability to perform sit to stand transfer without increased pain MET  Restore normal sleep habits without disturbances due to pain MET  Restore ability to perform ADL's and household activities independently and without increased pain MET    Plan   See PRN through 12-24-20 and then discharge to HEP      Corine Andre, PT

## 2021-01-09 ENCOUNTER — IMMUNIZATION (OUTPATIENT)
Dept: FAMILY MEDICINE | Facility: CLINIC | Age: 76
End: 2021-01-09
Payer: MEDICARE

## 2021-01-09 DIAGNOSIS — Z23 NEED FOR VACCINATION: ICD-10-CM

## 2021-01-09 PROCEDURE — 91300 COVID-19, MRNA, LNP-S, PF, 30 MCG/0.3 ML DOSE VACCINE: CPT | Mod: PBBFAC | Performed by: INTERNAL MEDICINE

## 2021-01-21 ENCOUNTER — OFFICE VISIT (OUTPATIENT)
Dept: SPINE | Facility: CLINIC | Age: 76
End: 2021-01-21
Payer: COMMERCIAL

## 2021-01-21 VITALS
SYSTOLIC BLOOD PRESSURE: 156 MMHG | WEIGHT: 116.38 LBS | DIASTOLIC BLOOD PRESSURE: 83 MMHG | BODY MASS INDEX: 19.39 KG/M2 | HEIGHT: 65 IN | HEART RATE: 70 BPM

## 2021-01-21 DIAGNOSIS — M54.50 CHRONIC MIDLINE LOW BACK PAIN WITHOUT SCIATICA: ICD-10-CM

## 2021-01-21 DIAGNOSIS — M47.816 LUMBAR SPONDYLOSIS: Primary | ICD-10-CM

## 2021-01-21 DIAGNOSIS — G89.29 CHRONIC MIDLINE LOW BACK PAIN WITHOUT SCIATICA: ICD-10-CM

## 2021-01-21 PROCEDURE — 99999 PR PBB SHADOW E&M-EST. PATIENT-LVL III: ICD-10-PCS | Mod: PBBFAC,,, | Performed by: PHYSICIAN ASSISTANT

## 2021-01-21 PROCEDURE — 99213 PR OFFICE/OUTPT VISIT, EST, LEVL III, 20-29 MIN: ICD-10-PCS | Mod: S$GLB,,, | Performed by: PHYSICIAN ASSISTANT

## 2021-01-21 PROCEDURE — 99999 PR PBB SHADOW E&M-EST. PATIENT-LVL III: CPT | Mod: PBBFAC,,, | Performed by: PHYSICIAN ASSISTANT

## 2021-01-21 PROCEDURE — 99213 OFFICE O/P EST LOW 20 MIN: CPT | Mod: S$GLB,,, | Performed by: PHYSICIAN ASSISTANT

## 2021-01-30 ENCOUNTER — IMMUNIZATION (OUTPATIENT)
Dept: FAMILY MEDICINE | Facility: CLINIC | Age: 76
End: 2021-01-30
Payer: MEDICARE

## 2021-01-30 DIAGNOSIS — Z23 NEED FOR VACCINATION: Primary | ICD-10-CM

## 2021-01-30 PROCEDURE — 91300 COVID-19, MRNA, LNP-S, PF, 30 MCG/0.3 ML DOSE VACCINE: CPT | Mod: PBBFAC | Performed by: INTERNAL MEDICINE

## 2021-01-30 PROCEDURE — 0002A COVID-19, MRNA, LNP-S, PF, 30 MCG/0.3 ML DOSE VACCINE: CPT | Mod: PBBFAC | Performed by: INTERNAL MEDICINE

## 2021-02-09 ENCOUNTER — DOCUMENTATION ONLY (OUTPATIENT)
Dept: REHABILITATION | Facility: HOSPITAL | Age: 76
End: 2021-02-09

## 2021-02-09 DIAGNOSIS — M25.551 RIGHT HIP PAIN: ICD-10-CM

## 2021-02-09 DIAGNOSIS — M62.81 MUSCLE WEAKNESS: ICD-10-CM

## 2021-02-09 DIAGNOSIS — M54.42 CHRONIC BILATERAL LOW BACK PAIN WITH LEFT-SIDED SCIATICA: Primary | ICD-10-CM

## 2021-02-09 DIAGNOSIS — G89.29 CHRONIC BILATERAL LOW BACK PAIN WITH LEFT-SIDED SCIATICA: Primary | ICD-10-CM

## 2021-02-18 ENCOUNTER — CLINICAL SUPPORT (OUTPATIENT)
Dept: REHABILITATION | Facility: HOSPITAL | Age: 76
End: 2021-02-18
Payer: MEDICARE

## 2021-02-18 DIAGNOSIS — M54.50 CHRONIC BILATERAL LOW BACK PAIN WITHOUT SCIATICA: Primary | ICD-10-CM

## 2021-02-18 DIAGNOSIS — M62.81 MUSCLE WEAKNESS: ICD-10-CM

## 2021-02-18 DIAGNOSIS — G89.29 CHRONIC BILATERAL LOW BACK PAIN WITHOUT SCIATICA: Primary | ICD-10-CM

## 2021-02-18 PROCEDURE — 97162 PT EVAL MOD COMPLEX 30 MIN: CPT | Mod: PN | Performed by: PHYSICAL THERAPIST

## 2021-02-18 PROCEDURE — 97110 THERAPEUTIC EXERCISES: CPT | Mod: PN | Performed by: PHYSICAL THERAPIST

## 2021-02-23 ENCOUNTER — CLINICAL SUPPORT (OUTPATIENT)
Dept: REHABILITATION | Facility: HOSPITAL | Age: 76
End: 2021-02-23
Payer: MEDICARE

## 2021-02-23 DIAGNOSIS — M62.81 MUSCLE WEAKNESS: ICD-10-CM

## 2021-02-23 DIAGNOSIS — G89.29 CHRONIC BILATERAL LOW BACK PAIN WITHOUT SCIATICA: ICD-10-CM

## 2021-02-23 DIAGNOSIS — M54.50 CHRONIC BILATERAL LOW BACK PAIN WITHOUT SCIATICA: ICD-10-CM

## 2021-02-23 PROCEDURE — 97110 THERAPEUTIC EXERCISES: CPT | Mod: PN | Performed by: PHYSICAL THERAPIST

## 2021-04-05 ENCOUNTER — TELEPHONE (OUTPATIENT)
Dept: SPINE | Facility: CLINIC | Age: 76
End: 2021-04-05

## 2021-04-06 ENCOUNTER — TELEPHONE (OUTPATIENT)
Dept: SPINE | Facility: CLINIC | Age: 76
End: 2021-04-06

## 2021-06-02 NOTE — PATIENT INSTRUCTIONS
General Neck and Back Pain    Both neck and back pain are usually caused by injury to the muscles or ligaments of the spine. Sometimes the disks that separate each bone of the spine may cause pain by pressing on a nearby nerve. Back and neck pain may appear after a sudden twisting or bending force (such as in a car accident), or sometimes after a simple awkward movement. In either case, muscle spasm is often present and adds to the pain.  Acute neck and back pain usually gets better in 1 to 2 weeks. Pain related to disk disease, arthritis in the spinal joints or spinal stenosis (narrowing of the spinal canal) can become chronic and last for months or years.  Back and neck pain are common problems. Most people feel better in 1 or 2 weeks, and most of the rest in 1 to 2 months. Most people can remain active.  People experience and describe pain differently.  · Pain can be sharp, stabbing, shooting, aching, cramping, or burning  · Movement, standing, bending, lifting, sitting, or walking may worsen the pain  · Pain can be localized to one spot or area, or it can be more generalized  · Pain can spread or radiate upwards, downwards, to the front, or go down your arms  · Muscle spasm may occur.  Most of the time mechanical problems with the muscles or spine cause the pain. it is usually caused by an injury, whether known or not, to the muscles or ligaments. While illnesses can cause back pain, it is usually not caused by a serious illness. Pain is usually related to physical activity, whether sports, exercise, work, or normal activity. Sometimes it can occur without an identifiable cause. This can happen simply by stretching or moving wrong, without noting pain at the time. Other causes include:  · Overexertion, lifting, pushing, pulling incorrectly or too aggressively.  · Sudden twisting, bending or stretching from an accident (car or fall), or accidental movement.  · Poor posture  · Poor conditioning, lack of regular  exercise  · Spinal disc disease or arthritis  · Stress  · Pregnancy, or illness like appendicitis, bladder or kidney infection, pelvic infections   Home care  · For neck pain: Use a comfortable pillow that supports the head and keeps the spine in a neutral position. The position of the head should not be tilted forward or backward.  · When in bed, try to find a position of comfort. A firm mattress is best. Try lying flat on your back with pillows under your knees. You can also try lying on your side with your knees bent up towards your chest and a pillow between your knees.  · At first, do not try to stretch out the sore spots. If there is a strain, it is not like the good soreness you get after exercising without an injury. In this case, stretching may make it worse.  · Avoid prolonged sitting, long car rides or travel. This puts more stress on the lower back than standing or walking.  · During the first 24 to 72 hours after an injury, apply an ice pack to the painful area for 20 minutes and then remove it for 20 minutes over a period of 60 to 90 minutes or several times a day.   · You can alternate ice and heat therapies. Talk with your healthcare provider about the best treatment for your back or neck pain. As a safety precaution, do not use a heating pad at bedtime. Sleeping with a heating pad can lead to skin burns or tissue damage.  · Therapeutic massage can help relax the back and neck muscles without stretching them.  · Be aware of safe lifting methods and do not lift anything over 15 pounds until all the pain is gone.  Medications  Talk to your healthcare provider before using medicine, especially if you have other medical problems or are taking other medicines.  · You may use over-the-counter medicine to control pain, unless another pain medicine was prescribed. If you have chronic conditions like diabetes, liver or kidney disease, stomach ulcers,  gastrointestinal bleeding, or are taking blood thinner  medicines.  · Be careful if you are given pain medicines, narcotics, or medicine for muscle spasm. They can cause drowsiness, and can affect your coordination, reflexes, and judgment. Do not drive or operate heavy machinery.  Follow-up care  Follow up with your healthcare provider, or as advised. Physical therapy or further tests may be needed.  If X-rays were taken, you will be notified of any new findings that may affect your care.  Call 911  Seek emergency medical care if any of the following occur:  · Trouble breathing  · Confusion  · Very drowsy or trouble awakening  · Fainting or loss of consciousness  · Rapid or very slow heart rate  · Loss of bowel or bladder control  When to seek medical advice  Call your healthcare provider right away if any of these occur:  · Pain becomes worse or spreads into your arms or legs  · Weakness, numbness or pain in one or both arms or legs  · Numbness in the groin area  · Difficulty walking  · Fever of 100.4ºF (38ºC) or higher, or as directed by your healthcare provider  Date Last Reviewed: 7/1/2016 © 2000-2017 [employer]. [address] [place] [address], [place] [address]. All rights reserved. This information is not intended as a substitute for professional medical care. Always follow your healthcare professional's instructions.         with patient

## 2021-10-04 ENCOUNTER — IMMUNIZATION (OUTPATIENT)
Dept: FAMILY MEDICINE | Facility: CLINIC | Age: 76
End: 2021-10-04
Payer: MEDICARE

## 2021-10-04 DIAGNOSIS — Z23 NEED FOR VACCINATION: Primary | ICD-10-CM

## 2021-10-04 PROCEDURE — 0003A COVID-19, MRNA, LNP-S, PF, 30 MCG/0.3 ML DOSE VACCINE: CPT | Mod: PBBFAC | Performed by: FAMILY MEDICINE

## 2021-10-04 PROCEDURE — 91300 COVID-19, MRNA, LNP-S, PF, 30 MCG/0.3 ML DOSE VACCINE: CPT | Mod: PBBFAC | Performed by: FAMILY MEDICINE

## 2021-10-08 ENCOUNTER — CLINICAL SUPPORT (OUTPATIENT)
Dept: URGENT CARE | Facility: CLINIC | Age: 76
End: 2021-10-08
Payer: MEDICARE

## 2021-10-08 DIAGNOSIS — Z71.84 COUNSELING FOR TRAVEL: Primary | ICD-10-CM

## 2021-10-08 LAB
CTP QC/QA: YES
SARS-COV-2 RDRP RESP QL NAA+PROBE: NEGATIVE

## 2021-10-08 PROCEDURE — 99211 PR OFFICE/OUTPT VISIT, EST, LEVL I: ICD-10-PCS | Mod: S$GLB,CS,, | Performed by: EMERGENCY MEDICINE

## 2021-10-08 PROCEDURE — U0002 COVID-19 LAB TEST NON-CDC: HCPCS | Mod: QW,S$GLB,, | Performed by: EMERGENCY MEDICINE

## 2021-10-08 PROCEDURE — U0002: ICD-10-PCS | Mod: QW,S$GLB,, | Performed by: EMERGENCY MEDICINE

## 2021-10-08 PROCEDURE — 99211 OFF/OP EST MAY X REQ PHY/QHP: CPT | Mod: S$GLB,CS,, | Performed by: EMERGENCY MEDICINE

## 2021-10-27 ENCOUNTER — PES CALL (OUTPATIENT)
Dept: ADMINISTRATIVE | Facility: CLINIC | Age: 76
End: 2021-10-27
Payer: MEDICARE

## 2021-11-03 ENCOUNTER — OFFICE VISIT (OUTPATIENT)
Dept: URGENT CARE | Facility: CLINIC | Age: 76
End: 2021-11-03
Payer: MEDICARE

## 2021-11-03 VITALS
OXYGEN SATURATION: 98 % | HEART RATE: 60 BPM | RESPIRATION RATE: 16 BRPM | DIASTOLIC BLOOD PRESSURE: 63 MMHG | HEIGHT: 65 IN | WEIGHT: 116 LBS | BODY MASS INDEX: 19.33 KG/M2 | SYSTOLIC BLOOD PRESSURE: 136 MMHG | TEMPERATURE: 98 F

## 2021-11-03 DIAGNOSIS — R05.9 COUGH: ICD-10-CM

## 2021-11-03 DIAGNOSIS — J06.9 VIRAL URI WITH COUGH: Primary | ICD-10-CM

## 2021-11-03 LAB
CTP QC/QA: YES
SARS-COV-2 RDRP RESP QL NAA+PROBE: NEGATIVE

## 2021-11-03 PROCEDURE — U0002 COVID-19 LAB TEST NON-CDC: HCPCS | Mod: QW,S$GLB,, | Performed by: PHYSICIAN ASSISTANT

## 2021-11-03 PROCEDURE — 99213 OFFICE O/P EST LOW 20 MIN: CPT | Mod: S$GLB,,, | Performed by: PHYSICIAN ASSISTANT

## 2021-11-03 PROCEDURE — 3008F PR BODY MASS INDEX (BMI) DOCUMENTED: ICD-10-PCS | Mod: CPTII,S$GLB,, | Performed by: PHYSICIAN ASSISTANT

## 2021-11-03 PROCEDURE — 3008F BODY MASS INDEX DOCD: CPT | Mod: CPTII,S$GLB,, | Performed by: PHYSICIAN ASSISTANT

## 2021-11-03 PROCEDURE — 99213 PR OFFICE/OUTPT VISIT, EST, LEVL III, 20-29 MIN: ICD-10-PCS | Mod: S$GLB,,, | Performed by: PHYSICIAN ASSISTANT

## 2021-11-03 PROCEDURE — 3078F DIAST BP <80 MM HG: CPT | Mod: CPTII,S$GLB,, | Performed by: PHYSICIAN ASSISTANT

## 2021-11-03 PROCEDURE — 1160F PR REVIEW ALL MEDS BY PRESCRIBER/CLIN PHARMACIST DOCUMENTED: ICD-10-PCS | Mod: CPTII,S$GLB,, | Performed by: PHYSICIAN ASSISTANT

## 2021-11-03 PROCEDURE — 3078F PR MOST RECENT DIASTOLIC BLOOD PRESSURE < 80 MM HG: ICD-10-PCS | Mod: CPTII,S$GLB,, | Performed by: PHYSICIAN ASSISTANT

## 2021-11-03 PROCEDURE — 1159F PR MEDICATION LIST DOCUMENTED IN MEDICAL RECORD: ICD-10-PCS | Mod: CPTII,S$GLB,, | Performed by: PHYSICIAN ASSISTANT

## 2021-11-03 PROCEDURE — 3075F PR MOST RECENT SYSTOLIC BLOOD PRESS GE 130-139MM HG: ICD-10-PCS | Mod: CPTII,S$GLB,, | Performed by: PHYSICIAN ASSISTANT

## 2021-11-03 PROCEDURE — U0002: ICD-10-PCS | Mod: QW,S$GLB,, | Performed by: PHYSICIAN ASSISTANT

## 2021-11-03 PROCEDURE — 1159F MED LIST DOCD IN RCRD: CPT | Mod: CPTII,S$GLB,, | Performed by: PHYSICIAN ASSISTANT

## 2021-11-03 PROCEDURE — 3075F SYST BP GE 130 - 139MM HG: CPT | Mod: CPTII,S$GLB,, | Performed by: PHYSICIAN ASSISTANT

## 2021-11-03 PROCEDURE — 1160F RVW MEDS BY RX/DR IN RCRD: CPT | Mod: CPTII,S$GLB,, | Performed by: PHYSICIAN ASSISTANT

## 2021-11-03 RX ORDER — AZITHROMYCIN 250 MG/1
TABLET, FILM COATED ORAL
Qty: 6 TABLET | Refills: 0 | Status: SHIPPED | OUTPATIENT
Start: 2021-11-03 | End: 2021-11-16 | Stop reason: ALTCHOICE

## 2021-11-16 ENCOUNTER — OFFICE VISIT (OUTPATIENT)
Dept: FAMILY MEDICINE | Facility: CLINIC | Age: 76
End: 2021-11-16
Payer: MEDICARE

## 2021-11-16 VITALS
HEART RATE: 72 BPM | WEIGHT: 111.13 LBS | BODY MASS INDEX: 18.52 KG/M2 | DIASTOLIC BLOOD PRESSURE: 80 MMHG | SYSTOLIC BLOOD PRESSURE: 132 MMHG | HEIGHT: 65 IN | OXYGEN SATURATION: 98 %

## 2021-11-16 DIAGNOSIS — R63.6 UNDERWEIGHT: ICD-10-CM

## 2021-11-16 DIAGNOSIS — Z00.00 ENCOUNTER FOR PREVENTIVE HEALTH EXAMINATION: Primary | ICD-10-CM

## 2021-11-16 PROCEDURE — 3008F BODY MASS INDEX DOCD: CPT | Mod: CPTII,S$GLB,, | Performed by: NURSE PRACTITIONER

## 2021-11-16 PROCEDURE — 1126F PR PAIN SEVERITY QUANTIFIED, NO PAIN PRESENT: ICD-10-PCS | Mod: CPTII,S$GLB,, | Performed by: NURSE PRACTITIONER

## 2021-11-16 PROCEDURE — 1126F AMNT PAIN NOTED NONE PRSNT: CPT | Mod: CPTII,S$GLB,, | Performed by: NURSE PRACTITIONER

## 2021-11-16 PROCEDURE — 1160F RVW MEDS BY RX/DR IN RCRD: CPT | Mod: CPTII,S$GLB,, | Performed by: NURSE PRACTITIONER

## 2021-11-16 PROCEDURE — 3008F PR BODY MASS INDEX (BMI) DOCUMENTED: ICD-10-PCS | Mod: CPTII,S$GLB,, | Performed by: NURSE PRACTITIONER

## 2021-11-16 PROCEDURE — 3288F FALL RISK ASSESSMENT DOCD: CPT | Mod: CPTII,S$GLB,, | Performed by: NURSE PRACTITIONER

## 2021-11-16 PROCEDURE — 1170F PR FUNCTIONAL STATUS ASSESSED: ICD-10-PCS | Mod: CPTII,S$GLB,, | Performed by: NURSE PRACTITIONER

## 2021-11-16 PROCEDURE — G0008 ADMIN INFLUENZA VIRUS VAC: HCPCS | Mod: S$GLB,,, | Performed by: NURSE PRACTITIONER

## 2021-11-16 PROCEDURE — 1159F MED LIST DOCD IN RCRD: CPT | Mod: CPTII,S$GLB,, | Performed by: NURSE PRACTITIONER

## 2021-11-16 PROCEDURE — 1101F PT FALLS ASSESS-DOCD LE1/YR: CPT | Mod: CPTII,S$GLB,, | Performed by: NURSE PRACTITIONER

## 2021-11-16 PROCEDURE — 1101F PR PT FALLS ASSESS DOC 0-1 FALLS W/OUT INJ PAST YR: ICD-10-PCS | Mod: CPTII,S$GLB,, | Performed by: NURSE PRACTITIONER

## 2021-11-16 PROCEDURE — 1159F PR MEDICATION LIST DOCUMENTED IN MEDICAL RECORD: ICD-10-PCS | Mod: CPTII,S$GLB,, | Performed by: NURSE PRACTITIONER

## 2021-11-16 PROCEDURE — 90694 VACC AIIV4 NO PRSRV 0.5ML IM: CPT | Mod: S$GLB,,, | Performed by: NURSE PRACTITIONER

## 2021-11-16 PROCEDURE — 3079F PR MOST RECENT DIASTOLIC BLOOD PRESSURE 80-89 MM HG: ICD-10-PCS | Mod: CPTII,S$GLB,, | Performed by: NURSE PRACTITIONER

## 2021-11-16 PROCEDURE — G0439 PPPS, SUBSEQ VISIT: HCPCS | Mod: S$GLB,,, | Performed by: NURSE PRACTITIONER

## 2021-11-16 PROCEDURE — 90694 FLU VACCINE - QUADRIVALENT - ADJUVANTED: ICD-10-PCS | Mod: S$GLB,,, | Performed by: NURSE PRACTITIONER

## 2021-11-16 PROCEDURE — G0008 FLU VACCINE - QUADRIVALENT - ADJUVANTED: ICD-10-PCS | Mod: S$GLB,,, | Performed by: NURSE PRACTITIONER

## 2021-11-16 PROCEDURE — 99999 PR PBB SHADOW E&M-EST. PATIENT-LVL III: CPT | Mod: PBBFAC,,, | Performed by: NURSE PRACTITIONER

## 2021-11-16 PROCEDURE — 3075F SYST BP GE 130 - 139MM HG: CPT | Mod: CPTII,S$GLB,, | Performed by: NURSE PRACTITIONER

## 2021-11-16 PROCEDURE — 1170F FXNL STATUS ASSESSED: CPT | Mod: CPTII,S$GLB,, | Performed by: NURSE PRACTITIONER

## 2021-11-16 PROCEDURE — 3288F PR FALLS RISK ASSESSMENT DOCUMENTED: ICD-10-PCS | Mod: CPTII,S$GLB,, | Performed by: NURSE PRACTITIONER

## 2021-11-16 PROCEDURE — G0439 PR MEDICARE ANNUAL WELLNESS SUBSEQUENT VISIT: ICD-10-PCS | Mod: S$GLB,,, | Performed by: NURSE PRACTITIONER

## 2021-11-16 PROCEDURE — 1160F PR REVIEW ALL MEDS BY PRESCRIBER/CLIN PHARMACIST DOCUMENTED: ICD-10-PCS | Mod: CPTII,S$GLB,, | Performed by: NURSE PRACTITIONER

## 2021-11-16 PROCEDURE — 3079F DIAST BP 80-89 MM HG: CPT | Mod: CPTII,S$GLB,, | Performed by: NURSE PRACTITIONER

## 2021-11-16 PROCEDURE — 3075F PR MOST RECENT SYSTOLIC BLOOD PRESS GE 130-139MM HG: ICD-10-PCS | Mod: CPTII,S$GLB,, | Performed by: NURSE PRACTITIONER

## 2021-11-16 PROCEDURE — 99999 PR PBB SHADOW E&M-EST. PATIENT-LVL III: ICD-10-PCS | Mod: PBBFAC,,, | Performed by: NURSE PRACTITIONER

## 2021-11-17 ENCOUNTER — TELEPHONE (OUTPATIENT)
Dept: GASTROENTEROLOGY | Facility: CLINIC | Age: 76
End: 2021-11-17
Payer: MEDICARE

## 2022-02-22 ENCOUNTER — TELEPHONE (OUTPATIENT)
Dept: GASTROENTEROLOGY | Facility: CLINIC | Age: 77
End: 2022-02-22
Payer: MEDICARE

## 2022-03-03 ENCOUNTER — TELEPHONE (OUTPATIENT)
Dept: GASTROENTEROLOGY | Facility: CLINIC | Age: 77
End: 2022-03-03
Payer: MEDICARE

## 2022-05-09 ENCOUNTER — PATIENT MESSAGE (OUTPATIENT)
Dept: SMOKING CESSATION | Facility: CLINIC | Age: 77
End: 2022-05-09
Payer: MEDICARE

## 2022-05-24 ENCOUNTER — PATIENT MESSAGE (OUTPATIENT)
Dept: ADMINISTRATIVE | Facility: HOSPITAL | Age: 77
End: 2022-05-24
Payer: MEDICARE

## 2022-05-24 ENCOUNTER — PATIENT OUTREACH (OUTPATIENT)
Dept: ADMINISTRATIVE | Facility: HOSPITAL | Age: 77
End: 2022-05-24
Payer: MEDICARE

## 2022-05-24 NOTE — LETTER
June 2, 2022    Lilli Stringer  [address] [place] [address]             [facility]  [address]  [place] [address]  Phone: [phone] Dear Lilli,    We have tried to reach you by [facility] email unsuccessfully.    [facility] is committed to your overall health.  To help you get the most out of each of your visits, we will review your information to make sure you are up to date on all of your recommended tests and/or procedures.       Smith Gonzáles MD  has found that your chart shows you may be due for the following:     Fasting cholesterol labs (Lipid Panel)  Pneumonia Immunization  Tetanus Immunization  Osteoporosis screening (DEXA SCAN)  Colon cancer screening  Mammogram        If you have had any of the above done at another facility, please bring the records with you or fax them to [phone] so that your record at [facility] will be complete. If you have not had any of these tests or procedures done recently and would like to complete this testing ,  please call [phone] or send a message through your [facility] portal to your provider's office.      If you have an upcoming scheduled appointment for the above test and/or procedures, please disregard this letter.     If you are currently taking medication, please bring it with you to your appointment for review.     Sincerely,     Smith Gonzáles MD and your [facility] Team

## 2022-05-24 NOTE — PROGRESS NOTES
2022 Care Everywhere updates requested and reviewed.  Immunizations reconciled. Media reports reviewed.  Duplicate HM overrides and  orders removed.  Overdue HM topic chart audit and/or requested.  Overdue lab testing linked to upcoming lab appointments if applies.        DIS reviewed      Mammogram and DEXA        Health Maintenance Due   Topic Date Due    Lipid Panel  Never done    Pneumococcal Vaccines (Age 65+) (1 - PCV) Never done    TETANUS VACCINE  Never done    DEXA Scan  Never done    Colorectal Cancer Screening  Never done    Shingles Vaccine (1 of 2) Never done    COVID-19 Vaccine (4 - Booster for Pfizer series) 2022

## 2022-05-26 ENCOUNTER — PES CALL (OUTPATIENT)
Dept: ADMINISTRATIVE | Facility: CLINIC | Age: 77
End: 2022-05-26
Payer: MEDICARE

## 2022-06-10 ENCOUNTER — TELEPHONE (OUTPATIENT)
Dept: FAMILY MEDICINE | Facility: CLINIC | Age: 77
End: 2022-06-10
Payer: MEDICARE

## 2022-06-10 ENCOUNTER — OFFICE VISIT (OUTPATIENT)
Dept: FAMILY MEDICINE | Facility: CLINIC | Age: 77
End: 2022-06-10
Payer: MEDICARE

## 2022-06-10 ENCOUNTER — HOSPITAL ENCOUNTER (OUTPATIENT)
Dept: RADIOLOGY | Facility: HOSPITAL | Age: 77
Discharge: HOME | End: 2022-06-10
Attending: FAMILY MEDICINE
Payer: MEDICARE

## 2022-06-10 VITALS
OXYGEN SATURATION: 99 % | HEIGHT: 65 IN | DIASTOLIC BLOOD PRESSURE: 76 MMHG | SYSTOLIC BLOOD PRESSURE: 122 MMHG | HEART RATE: 105 BPM | BODY MASS INDEX: 18.52 KG/M2 | WEIGHT: 111.13 LBS

## 2022-06-10 DIAGNOSIS — T85.9XXA COMPLICATION ASSOCIATED WITH SILICONE GEL-FILLED BREAST IMPLANT: Primary | ICD-10-CM

## 2022-06-10 DIAGNOSIS — Z01.818 PREOP EXAMINATION: ICD-10-CM

## 2022-06-10 DIAGNOSIS — T85.9XXA COMPLICATION ASSOCIATED WITH SILICONE GEL-FILLED BREAST IMPLANT: ICD-10-CM

## 2022-06-10 DIAGNOSIS — S21.001A OPEN WOUND OF RIGHT FEMALE BREAST, INITIAL ENCOUNTER: ICD-10-CM

## 2022-06-10 PROBLEM — M46.1 SACROILIITIS: Status: ACTIVE | Noted: 2022-06-10

## 2022-06-10 PROCEDURE — 1126F AMNT PAIN NOTED NONE PRSNT: CPT | Mod: CPTII,S$GLB,, | Performed by: FAMILY MEDICINE

## 2022-06-10 PROCEDURE — 1101F PR PT FALLS ASSESS DOC 0-1 FALLS W/OUT INJ PAST YR: ICD-10-PCS | Mod: CPTII,S$GLB,, | Performed by: FAMILY MEDICINE

## 2022-06-10 PROCEDURE — 99999 PR PBB SHADOW E&M-EST. PATIENT-LVL III: CPT | Mod: PBBFAC,,, | Performed by: FAMILY MEDICINE

## 2022-06-10 PROCEDURE — 71046 X-RAY EXAM CHEST 2 VIEWS: CPT | Mod: 26,,, | Performed by: RADIOLOGY

## 2022-06-10 PROCEDURE — 71046 X-RAY EXAM CHEST 2 VIEWS: CPT | Mod: TC,FY,PO

## 2022-06-10 PROCEDURE — 3074F SYST BP LT 130 MM HG: CPT | Mod: CPTII,S$GLB,, | Performed by: FAMILY MEDICINE

## 2022-06-10 PROCEDURE — 3288F FALL RISK ASSESSMENT DOCD: CPT | Mod: CPTII,S$GLB,, | Performed by: FAMILY MEDICINE

## 2022-06-10 PROCEDURE — 1126F PR PAIN SEVERITY QUANTIFIED, NO PAIN PRESENT: ICD-10-PCS | Mod: CPTII,S$GLB,, | Performed by: FAMILY MEDICINE

## 2022-06-10 PROCEDURE — 3078F DIAST BP <80 MM HG: CPT | Mod: CPTII,S$GLB,, | Performed by: FAMILY MEDICINE

## 2022-06-10 PROCEDURE — 99214 OFFICE O/P EST MOD 30 MIN: CPT | Mod: S$GLB,,, | Performed by: FAMILY MEDICINE

## 2022-06-10 PROCEDURE — 1101F PT FALLS ASSESS-DOCD LE1/YR: CPT | Mod: CPTII,S$GLB,, | Performed by: FAMILY MEDICINE

## 2022-06-10 PROCEDURE — 99214 PR OFFICE/OUTPT VISIT, EST, LEVL IV, 30-39 MIN: ICD-10-PCS | Mod: S$GLB,,, | Performed by: FAMILY MEDICINE

## 2022-06-10 PROCEDURE — 71046 XR CHEST PA AND LATERAL: ICD-10-PCS | Mod: 26,,, | Performed by: RADIOLOGY

## 2022-06-10 PROCEDURE — 3078F PR MOST RECENT DIASTOLIC BLOOD PRESSURE < 80 MM HG: ICD-10-PCS | Mod: CPTII,S$GLB,, | Performed by: FAMILY MEDICINE

## 2022-06-10 PROCEDURE — 3074F PR MOST RECENT SYSTOLIC BLOOD PRESSURE < 130 MM HG: ICD-10-PCS | Mod: CPTII,S$GLB,, | Performed by: FAMILY MEDICINE

## 2022-06-10 PROCEDURE — 3288F PR FALLS RISK ASSESSMENT DOCUMENTED: ICD-10-PCS | Mod: CPTII,S$GLB,, | Performed by: FAMILY MEDICINE

## 2022-06-10 PROCEDURE — 99999 PR PBB SHADOW E&M-EST. PATIENT-LVL III: ICD-10-PCS | Mod: PBBFAC,,, | Performed by: FAMILY MEDICINE

## 2022-06-10 RX ORDER — DOXYCYCLINE HYCLATE 100 MG
100 TABLET ORAL 2 TIMES DAILY
Qty: 20 TABLET | Refills: 0 | Status: ON HOLD | OUTPATIENT
Start: 2022-06-10 | End: 2022-06-21 | Stop reason: HOSPADM

## 2022-06-10 RX ORDER — FERROUS SULFATE, DRIED 160(50) MG
TABLET, EXTENDED RELEASE ORAL 2 TIMES DAILY WITH MEALS
COMMUNITY

## 2022-06-10 NOTE — PROGRESS NOTES
Subjective:       Patient ID: Lilli Stringer is a 76 y.o. female.    Chief Complaint: Annual Exam (Right breast is starting to leak been going on for two weeks)        She had breast implants in the 1980s.    C/o some leaking from the medial aspect of right breast for the last 2 weeks with obvious open defects and exposed silicon implant.  It is leaking from multiple locations.  Using bandages to collect the leakage.  Leakage is copius.  Leakage is beige in color.  No significant pain or fever.    Mammogram in 2016 showed: new free silicone seen in the Lower-inner region of the right breast  She has supposed to have the implants removed at that time but decided not to pursue the surgery.      Past Medical History:   Diagnosis Date    Allergy     Endometriosis of uterus        Past Surgical History:   Procedure Laterality Date    BREAST SURGERY  1980s    DILATION AND CURETTAGE OF UTERUS      TUBAL LIGATION         Review of patient's allergies indicates:   Allergen Reactions    Iodine and iodide containing products     Sulfa (sulfonamide antibiotics)        Social History     Socioeconomic History    Marital status:     Number of children: 1   Occupational History    Occupation: [employer]   Tobacco Use    Smoking status: Light Tobacco Smoker     Packs/day: 0.25     Types: Cigarettes    Smokeless tobacco: Never Used   Substance and Sexual Activity    Alcohol use: Yes     Alcohol/week: 2.0 standard drinks     Types: 2 Glasses of wine per week     Comment: social    Drug use: No    Sexual activity: Yes     Partners: Male     Birth control/protection: None, Surgical       Current Outpatient Medications on File Prior to Visit   Medication Sig Dispense Refill    calcium-vitamin D3 (OS-KAVITHA 500 + D3) 500 mg-5 mcg (200 unit) per tablet Take by mouth 2 (two) times daily with meals.      fluticasone (FLONASE) 50 mcg/actuation nasal spray APPLY TWO SPRAYS NASALLY ONCE TO TWICE DAILY DEPENDING  "ON SEVERITY OFSYMPTOMS 1 Bottle 0    multivitamin capsule Take 1 capsule by mouth once daily.      vitamin E 100 UNIT capsule Take 100 Units by mouth once daily.       No current facility-administered medications on file prior to visit.       Family History   Problem Relation Age of Onset    Alzheimer's disease Mother     Cancer Mother     Breast cancer Neg Hx     Ovarian cancer Neg Hx        Review of Systems   Constitutional: Negative for appetite change, chills, fever and unexpected weight change.   HENT: Negative for sore throat and trouble swallowing.    Eyes: Negative for pain and visual disturbance.   Respiratory: Negative for cough, shortness of breath and wheezing.    Cardiovascular: Negative for chest pain and palpitations.   Gastrointestinal: Negative for abdominal pain, blood in stool, diarrhea, nausea and vomiting.   Genitourinary: Negative for difficulty urinating, dysuria and hematuria.   Musculoskeletal: Negative for arthralgias, gait problem and neck pain.   Skin: Negative for rash and wound.   Neurological: Negative for dizziness, weakness, numbness and headaches.   Hematological: Negative for adenopathy.   Psychiatric/Behavioral: Negative for dysphoric mood.       Objective:      /76   Pulse 105   Ht 5' 5" (1.651 m)   Wt 50.4 kg (111 lb 1.8 oz)   SpO2 99%   BMI 18.49 kg/m²   Physical Exam  Constitutional:       Appearance: Normal appearance. She is well-developed.   HENT:      Head: Normocephalic.      Mouth/Throat:      Pharynx: No oropharyngeal exudate or posterior oropharyngeal erythema.   Eyes:      Conjunctiva/sclera: Conjunctivae normal.      Pupils: Pupils are equal, round, and reactive to light.   Neck:      Thyroid: No thyromegaly.   Cardiovascular:      Rate and Rhythm: Normal rate and regular rhythm.      Heart sounds: Normal heart sounds, S1 normal and S2 normal. No murmur heard.    No friction rub. No gallop.   Pulmonary:      Effort: Pulmonary effort is normal.      " Breath sounds: Normal breath sounds. No wheezing or rales.   Chest:       Musculoskeletal:      Cervical back: Normal range of motion and neck supple.      Right lower leg: No edema.      Left lower leg: No edema.   Lymphadenopathy:      Cervical: No cervical adenopathy.   Skin:     General: Skin is warm.      Findings: No rash.   Neurological:      Mental Status: She is alert and oriented to person, place, and time.      Cranial Nerves: No cranial nerve deficit.      Gait: Gait normal.                 Assessment:       1. Complication associated with silicone gel-filled breast implant    2. Preop examination    3. Open wound of right female breast, initial encounter        Plan:       Complication associated with silicone gel-filled breast implant  -     CBC Auto Differential; Future; Expected date: 06/10/2022  -     Comprehensive Metabolic Panel; Future; Expected date: 06/10/2022  -     X-Ray Chest PA And Lateral; Future; Expected date: 06/10/2022  -     Ambulatory referral/consult to Plastic Surgery; Future; Expected date: 06/17/2022  -     doxycycline (VIBRA-TABS) 100 MG tablet; Take 1 tablet (100 mg total) by mouth 2 (two) times daily. for 10 days  Dispense: 20 tablet; Refill: 0    Preop examination  -     PROTIME-INR; Future; Expected date: 06/10/2022    Open wound of right female breast, initial encounter        Discussed case with breast surgeon, Dr. Patel.  Urgent referral entered to Dr. Kraft. Appointment made  Will start on empiric antibiotic coverage  Will get labs and CXR in anticipating of needed surgery.  ER precautions given due to increased risk of infection.  Dressings changed and counseled on basic open wound car

## 2022-06-10 NOTE — TELEPHONE ENCOUNTER
----- Message from Mary Alonso sent at 6/10/2022  2:06 PM CDT -----  Contact: PT  Type: Needs Medical Advice    Who Called: PT  Best Call Back Number: [phone]  Additional  Information: Pt wants nurse to give her a call back she has some questions  Please Advise- Thank you

## 2022-06-10 NOTE — TELEPHONE ENCOUNTER
attempted to contact pt, left message regarding apt for plastic surgeon on July 7th at 4pm with Dr. Kraft at the [facility]. LM to return call with any questions.    Spoke to pt in lobby, all info given regarding apt, LM on vm for Dr. Glez's nurse Diana to see if she can be seen sooner. Pt [place].

## 2022-06-12 PROBLEM — M46.1 SACROILIITIS: Status: RESOLVED | Noted: 2022-06-10 | Resolved: 2022-06-12

## 2022-06-17 ENCOUNTER — OFFICE VISIT (OUTPATIENT)
Dept: FAMILY MEDICINE | Facility: CLINIC | Age: 77
End: 2022-06-17
Payer: MEDICARE

## 2022-06-17 VITALS
SYSTOLIC BLOOD PRESSURE: 138 MMHG | BODY MASS INDEX: 18.11 KG/M2 | WEIGHT: 108.69 LBS | TEMPERATURE: 99 F | OXYGEN SATURATION: 98 % | DIASTOLIC BLOOD PRESSURE: 70 MMHG | HEART RATE: 70 BPM | HEIGHT: 65 IN | RESPIRATION RATE: 18 BRPM

## 2022-06-17 DIAGNOSIS — S21.001D OPEN WOUND OF RIGHT BREAST, SUBSEQUENT ENCOUNTER: ICD-10-CM

## 2022-06-17 DIAGNOSIS — T85.9XXA COMPLICATION ASSOCIATED WITH SILICONE GEL-FILLED BREAST IMPLANT: Primary | ICD-10-CM

## 2022-06-17 DIAGNOSIS — F17.200 NEEDS SMOKING CESSATION EDUCATION: ICD-10-CM

## 2022-06-17 PROCEDURE — 1159F MED LIST DOCD IN RCRD: CPT | Mod: CPTII,S$GLB,, | Performed by: FAMILY MEDICINE

## 2022-06-17 PROCEDURE — 1101F PT FALLS ASSESS-DOCD LE1/YR: CPT | Mod: CPTII,S$GLB,, | Performed by: FAMILY MEDICINE

## 2022-06-17 PROCEDURE — 1126F PR PAIN SEVERITY QUANTIFIED, NO PAIN PRESENT: ICD-10-PCS | Mod: CPTII,S$GLB,, | Performed by: FAMILY MEDICINE

## 2022-06-17 PROCEDURE — 99213 PR OFFICE/OUTPT VISIT, EST, LEVL III, 20-29 MIN: ICD-10-PCS | Mod: S$GLB,,, | Performed by: FAMILY MEDICINE

## 2022-06-17 PROCEDURE — 3078F PR MOST RECENT DIASTOLIC BLOOD PRESSURE < 80 MM HG: ICD-10-PCS | Mod: CPTII,S$GLB,, | Performed by: FAMILY MEDICINE

## 2022-06-17 PROCEDURE — 3075F PR MOST RECENT SYSTOLIC BLOOD PRESS GE 130-139MM HG: ICD-10-PCS | Mod: CPTII,S$GLB,, | Performed by: FAMILY MEDICINE

## 2022-06-17 PROCEDURE — 3288F PR FALLS RISK ASSESSMENT DOCUMENTED: ICD-10-PCS | Mod: CPTII,S$GLB,, | Performed by: FAMILY MEDICINE

## 2022-06-17 PROCEDURE — 1101F PR PT FALLS ASSESS DOC 0-1 FALLS W/OUT INJ PAST YR: ICD-10-PCS | Mod: CPTII,S$GLB,, | Performed by: FAMILY MEDICINE

## 2022-06-17 PROCEDURE — 99213 OFFICE O/P EST LOW 20 MIN: CPT | Mod: S$GLB,,, | Performed by: FAMILY MEDICINE

## 2022-06-17 PROCEDURE — 3075F SYST BP GE 130 - 139MM HG: CPT | Mod: CPTII,S$GLB,, | Performed by: FAMILY MEDICINE

## 2022-06-17 PROCEDURE — 99999 PR PBB SHADOW E&M-EST. PATIENT-LVL III: ICD-10-PCS | Mod: PBBFAC,,, | Performed by: FAMILY MEDICINE

## 2022-06-17 PROCEDURE — 3288F FALL RISK ASSESSMENT DOCD: CPT | Mod: CPTII,S$GLB,, | Performed by: FAMILY MEDICINE

## 2022-06-17 PROCEDURE — 1126F AMNT PAIN NOTED NONE PRSNT: CPT | Mod: CPTII,S$GLB,, | Performed by: FAMILY MEDICINE

## 2022-06-17 PROCEDURE — 1159F PR MEDICATION LIST DOCUMENTED IN MEDICAL RECORD: ICD-10-PCS | Mod: CPTII,S$GLB,, | Performed by: FAMILY MEDICINE

## 2022-06-17 PROCEDURE — 3078F DIAST BP <80 MM HG: CPT | Mod: CPTII,S$GLB,, | Performed by: FAMILY MEDICINE

## 2022-06-17 PROCEDURE — 99999 PR PBB SHADOW E&M-EST. PATIENT-LVL III: CPT | Mod: PBBFAC,,, | Performed by: FAMILY MEDICINE

## 2022-06-17 NOTE — PROGRESS NOTES
Subjective:       Patient ID: Lilli Stringer is a 76 y.o. female.    Chief Complaint: No chief complaint on file.    Here for f/u on breast implant complications  She reports the silicon implant in the right breast completely came through the skin and fell out in her sink this morning.  Now with large open wound in right breast    She has appt with plastic surgeon, Dr. Kraft on 7/7.    She had breast implants in the 1980s.    Mammogram in 2016 showed: new free silicone seen in the Lower-inner region of the right breast  She has supposed to have the implants removed at that time but decided not to pursue the surgery.      Past Medical History:   Diagnosis Date    Allergy     Endometriosis of uterus        Past Surgical History:   Procedure Laterality Date    BREAST SURGERY  1980s    DILATION AND CURETTAGE OF UTERUS      TUBAL LIGATION         Review of patient's allergies indicates:   Allergen Reactions    Iodine and iodide containing products     Sulfa (sulfonamide antibiotics)        Social History     Socioeconomic History    Marital status:     Number of children: 1   Occupational History    Occupation: [employer]   Tobacco Use    Smoking status: Light Tobacco Smoker     Packs/day: 0.25     Types: Cigarettes    Smokeless tobacco: Never Used   Substance and Sexual Activity    Alcohol use: Yes     Alcohol/week: 2.0 standard drinks     Types: 2 Glasses of wine per week     Comment: social    Drug use: No    Sexual activity: Yes     Partners: Male     Birth control/protection: None, Surgical       Current Outpatient Medications on File Prior to Visit   Medication Sig Dispense Refill    calcium-vitamin D3 (OS-KAVITHA 500 + D3) 500 mg-5 mcg (200 unit) per tablet Take by mouth 2 (two) times daily with meals.      doxycycline (VIBRA-TABS) 100 MG tablet Take 1 tablet (100 mg total) by mouth 2 (two) times daily. for 10 days 20 tablet 0    fluticasone (FLONASE) 50 mcg/actuation nasal spray  "APPLY TWO SPRAYS NASALLY ONCE TO TWICE DAILY DEPENDING ON SEVERITY OFSYMPTOMS 1 Bottle 0    multivitamin capsule Take 1 capsule by mouth once daily.      vitamin E 100 UNIT capsule Take 100 Units by mouth once daily.       No current facility-administered medications on file prior to visit.       Family History   Problem Relation Age of Onset    Alzheimer's disease Mother     Cancer Mother     Breast cancer Neg Hx     Ovarian cancer Neg Hx        Review of Systems   Constitutional: Negative for appetite change, chills, fever and unexpected weight change.   HENT: Negative for sore throat and trouble swallowing.    Eyes: Negative for pain and visual disturbance.   Respiratory: Negative for cough, shortness of breath and wheezing.    Cardiovascular: Negative for chest pain and palpitations.   Gastrointestinal: Negative for abdominal pain, blood in stool, diarrhea, nausea and vomiting.   Genitourinary: Negative for difficulty urinating, dysuria and hematuria.   Musculoskeletal: Negative for arthralgias, gait problem and neck pain.   Skin: Positive for wound. Negative for rash.   Neurological: Negative for dizziness, weakness, numbness and headaches.   Hematological: Negative for adenopathy.   Psychiatric/Behavioral: Negative for dysphoric mood.       Objective:      /70   Pulse 70   Temp 98.5 °F (36.9 °C) (Oral)   Resp 18   Ht 5' 5" (1.651 m)   Wt 49.3 kg (108 lb 11 oz)   SpO2 98%   BMI 18.09 kg/m²   Physical Exam  Constitutional:       Appearance: Normal appearance. She is well-developed.   HENT:      Head: Normocephalic.      Mouth/Throat:      Pharynx: No oropharyngeal exudate or posterior oropharyngeal erythema.   Eyes:      Conjunctiva/sclera: Conjunctivae normal.      Pupils: Pupils are equal, round, and reactive to light.   Neck:      Thyroid: No thyromegaly.   Cardiovascular:      Rate and Rhythm: Normal rate and regular rhythm.      Heart sounds: Normal heart sounds, S1 normal and S2 normal. " No murmur heard.    No friction rub. No gallop.   Pulmonary:      Effort: Pulmonary effort is normal.      Breath sounds: Normal breath sounds. No wheezing or rales.   Chest:       Musculoskeletal:      Cervical back: Normal range of motion and neck supple.      Right lower leg: No edema.      Left lower leg: No edema.   Lymphadenopathy:      Cervical: No cervical adenopathy.   Skin:     General: Skin is warm.      Findings: No rash.   Neurological:      Mental Status: She is alert and oriented to person, place, and time.      Cranial Nerves: No cranial nerve deficit.      Gait: Gait normal.           Assessment:       1. Complication associated with silicone gel-filled breast implant    2. Open wound of right breast, subsequent encounter        Plan:       Complication associated with silicone gel-filled breast implant    Open wound of right breast, subsequent encounter        Discussed case with breast surgeon on call for Dr. Kraft who advised patient report to ER and for consultation with surgeon on call  Patient directed to ER for surgical wound evaluation  Sterile dressing placed and patient brought to ER by   ER triage notified

## 2022-06-19 PROBLEM — R78.81 BACTEREMIA: Status: ACTIVE | Noted: 2022-06-19

## 2022-06-30 ENCOUNTER — OFFICE VISIT (OUTPATIENT)
Dept: FAMILY MEDICINE | Facility: CLINIC | Age: 77
End: 2022-06-30
Payer: MEDICARE

## 2022-06-30 VITALS
BODY MASS INDEX: 18.47 KG/M2 | WEIGHT: 110.88 LBS | HEIGHT: 65 IN | DIASTOLIC BLOOD PRESSURE: 60 MMHG | OXYGEN SATURATION: 99 % | HEART RATE: 61 BPM | SYSTOLIC BLOOD PRESSURE: 136 MMHG

## 2022-06-30 DIAGNOSIS — L08.9 COMPLICATED WOUND INFECTION: ICD-10-CM

## 2022-06-30 DIAGNOSIS — T14.8XXA COMPLICATED WOUND INFECTION: ICD-10-CM

## 2022-06-30 DIAGNOSIS — S21.001D OPEN WOUND OF RIGHT BREAST, SUBSEQUENT ENCOUNTER: Primary | ICD-10-CM

## 2022-06-30 PROBLEM — R78.81 BACTEREMIA: Status: RESOLVED | Noted: 2022-06-19 | Resolved: 2022-06-30

## 2022-06-30 PROCEDURE — 3075F SYST BP GE 130 - 139MM HG: CPT | Mod: CPTII,S$GLB,, | Performed by: FAMILY MEDICINE

## 2022-06-30 PROCEDURE — 99213 PR OFFICE/OUTPT VISIT, EST, LEVL III, 20-29 MIN: ICD-10-PCS | Mod: S$GLB,,, | Performed by: FAMILY MEDICINE

## 2022-06-30 PROCEDURE — 99999 PR PBB SHADOW E&M-EST. PATIENT-LVL III: CPT | Mod: PBBFAC,,, | Performed by: FAMILY MEDICINE

## 2022-06-30 PROCEDURE — 1101F PR PT FALLS ASSESS DOC 0-1 FALLS W/OUT INJ PAST YR: ICD-10-PCS | Mod: CPTII,S$GLB,, | Performed by: FAMILY MEDICINE

## 2022-06-30 PROCEDURE — 99999 PR PBB SHADOW E&M-EST. PATIENT-LVL III: ICD-10-PCS | Mod: PBBFAC,,, | Performed by: FAMILY MEDICINE

## 2022-06-30 PROCEDURE — 1159F PR MEDICATION LIST DOCUMENTED IN MEDICAL RECORD: ICD-10-PCS | Mod: CPTII,S$GLB,, | Performed by: FAMILY MEDICINE

## 2022-06-30 PROCEDURE — 1101F PT FALLS ASSESS-DOCD LE1/YR: CPT | Mod: CPTII,S$GLB,, | Performed by: FAMILY MEDICINE

## 2022-06-30 PROCEDURE — 1111F PR DISCHARGE MEDS RECONCILED W/ CURRENT OUTPATIENT MED LIST: ICD-10-PCS | Mod: CPTII,S$GLB,, | Performed by: FAMILY MEDICINE

## 2022-06-30 PROCEDURE — 3288F PR FALLS RISK ASSESSMENT DOCUMENTED: ICD-10-PCS | Mod: CPTII,S$GLB,, | Performed by: FAMILY MEDICINE

## 2022-06-30 PROCEDURE — 1160F RVW MEDS BY RX/DR IN RCRD: CPT | Mod: CPTII,S$GLB,, | Performed by: FAMILY MEDICINE

## 2022-06-30 PROCEDURE — 99213 OFFICE O/P EST LOW 20 MIN: CPT | Mod: S$GLB,,, | Performed by: FAMILY MEDICINE

## 2022-06-30 PROCEDURE — 3078F PR MOST RECENT DIASTOLIC BLOOD PRESSURE < 80 MM HG: ICD-10-PCS | Mod: CPTII,S$GLB,, | Performed by: FAMILY MEDICINE

## 2022-06-30 PROCEDURE — 1159F MED LIST DOCD IN RCRD: CPT | Mod: CPTII,S$GLB,, | Performed by: FAMILY MEDICINE

## 2022-06-30 PROCEDURE — 1125F PR PAIN SEVERITY QUANTIFIED, PAIN PRESENT: ICD-10-PCS | Mod: CPTII,S$GLB,, | Performed by: FAMILY MEDICINE

## 2022-06-30 PROCEDURE — 3078F DIAST BP <80 MM HG: CPT | Mod: CPTII,S$GLB,, | Performed by: FAMILY MEDICINE

## 2022-06-30 PROCEDURE — 1125F AMNT PAIN NOTED PAIN PRSNT: CPT | Mod: CPTII,S$GLB,, | Performed by: FAMILY MEDICINE

## 2022-06-30 PROCEDURE — 3288F FALL RISK ASSESSMENT DOCD: CPT | Mod: CPTII,S$GLB,, | Performed by: FAMILY MEDICINE

## 2022-06-30 PROCEDURE — 1111F DSCHRG MED/CURRENT MED MERGE: CPT | Mod: CPTII,S$GLB,, | Performed by: FAMILY MEDICINE

## 2022-06-30 PROCEDURE — 3075F PR MOST RECENT SYSTOLIC BLOOD PRESS GE 130-139MM HG: ICD-10-PCS | Mod: CPTII,S$GLB,, | Performed by: FAMILY MEDICINE

## 2022-06-30 PROCEDURE — 1160F PR REVIEW ALL MEDS BY PRESCRIBER/CLIN PHARMACIST DOCUMENTED: ICD-10-PCS | Mod: CPTII,S$GLB,, | Performed by: FAMILY MEDICINE

## 2022-06-30 NOTE — PROGRESS NOTES
"Subjective:       Patient ID: Lilli Stringer is a 76 y.o. female.    Chief Complaint: Follow-up    Here to f/u on recent hospitalization    Admission Date: 6/17/2022  Hospital Length of Stay: 2 days  Discharge Date and Time: 6/21/2022  4:29 PM  Attending Physician: Andrea Mccullough MD  Discharging Provider: YVONNE Huntley  Primary Care Provider: Smith Gonzáles MD   Procedure(s) (LRB):  INCISION AND DRAINAGE, BREAST ABSCESS WITH BIOPSY (Right)   Hospital Course:  76 y.o. female presented to the ED with left breast wound. Patient with history of bilateral breast implants placed "a very long time ago". She developed a small wound medially which subsequently progressed with a new wound developing inferiorly.  Patient reports yesterday evening a "falling apart implant" fell out of the medial wound.  She has also been having some seropurulent drainage. She was admitted to General surgery and underwent washout and biopsy of wound. Infectious Disease and wound care consulted. Received IV Abx, midline placed. Discharged with home health for IV Abx and wound care. She will follow-up next week for wound check. She has an appt with a plastic surgeon first week of July.    Wounds improving and closing.  Following with general surgery with appt next week  She has appt with plastic surgery next month.  Getting IV antibiotics and following with ID, Dr. Moore with appt next week  Getting HH 3 times a week with wound care  She is taking probiotic daily            Past Medical History:   Diagnosis Date    Allergy     Endometriosis of uterus        Past Surgical History:   Procedure Laterality Date    BREAST SURGERY  1980s    DILATION AND CURETTAGE OF UTERUS      INCISION AND DRAINAGE OF BREAST Right 6/18/2022    Procedure: INCISION AND DRAINAGE, BREAST ABSCESS WITH BIOPSY;  Surgeon: Andrea Mccullough MD;  Location: [facility];  Service: General;  Laterality: Right;    TUBAL LIGATION         Review of patient's " allergies indicates:   Allergen Reactions    Iodine and iodide containing products Swelling    Sulfa (sulfonamide antibiotics) Swelling    Lidocaine-elastic bandage        Social History     Socioeconomic History    Marital status:     Number of children: 1   Occupational History    Occupation: [employer]   Tobacco Use    Smoking status: Former Smoker     Packs/day: 0.25     Types: Cigarettes    Smokeless tobacco: Never Used   Substance and Sexual Activity    Alcohol use: Yes     Alcohol/week: 5.0 standard drinks     Types: 5 Glasses of wine per week     Comment: social / maybe 1glass w dinner    Drug use: No    Sexual activity: Yes     Partners: Male     Birth control/protection: None, Surgical       Current Outpatient Medications on File Prior to Visit   Medication Sig Dispense Refill    ascorbic acid, vitamin C, 250 mg Chew Take 1 tablet by mouth Daily.      calcium-vitamin D3 (OS-KAVITHA 500 + D3) 500 mg-5 mcg (200 unit) per tablet Take by mouth 2 (two) times daily with meals.      fluticasone (FLONASE) 50 mcg/actuation nasal spray APPLY TWO SPRAYS NASALLY ONCE TO TWICE DAILY DEPENDING ON SEVERITY OFSYMPTOMS (Patient taking differently: 2 sprays by Each Nostril route 2 (two) times daily as needed for Rhinitis or Allergies.) 1 Bottle 0    multivitamin capsule Take 1 capsule by mouth once daily.      vancomycin HCl (VANCOMYCIN 1 G/250 ML D5W, READY TO MIX SYSTEM,) Inject 375 mLs (1,500 mg total) into the vein Daily. for 12 days      vitamin E 100 UNIT capsule Take 100 Units by mouth once daily.      Lactobacillus rhamnosus GG (CULTURELLE) 10 billion cell capsule Take 1 capsule by mouth once daily. (Patient not taking: Reported on 6/30/2022)      [DISCONTINUED] HYDROcodone-acetaminophen (NORCO) 5-325 mg per tablet Take 1 tablet by mouth every 6 (six) hours as needed for Pain. (Patient not taking: No sig reported) 20 tablet 0    [DISCONTINUED] ondansetron (ZOFRAN) 4 MG tablet Take 1 tablet (4  "mg total) by mouth every 6 (six) hours as needed for Nausea. (Patient not taking: Reported on 6/30/2022) 20 tablet 0    [DISCONTINUED] polyethylene glycol (GLYCOLAX) 17 gram PwPk Take 17 g by mouth 2 (two) times daily as needed. (Patient not taking: Reported on 6/30/2022)  0     No current facility-administered medications on file prior to visit.       Family History   Problem Relation Age of Onset    Alzheimer's disease Mother     Cancer Mother     Breast cancer Neg Hx     Ovarian cancer Neg Hx        Review of Systems   Constitutional: Negative for appetite change, chills, fever and unexpected weight change.   HENT: Negative for sore throat and trouble swallowing.    Eyes: Negative for pain and visual disturbance.   Respiratory: Negative for cough, shortness of breath and wheezing.    Cardiovascular: Negative for chest pain and palpitations.   Gastrointestinal: Negative for abdominal pain, blood in stool, diarrhea, nausea and vomiting.   Genitourinary: Negative for difficulty urinating, dysuria and hematuria.   Musculoskeletal: Negative for arthralgias, gait problem and neck pain.   Skin: Negative for rash and wound.   Neurological: Negative for dizziness, weakness, numbness and headaches.   Hematological: Negative for adenopathy.   Psychiatric/Behavioral: Negative for dysphoric mood.       Objective:      /60   Pulse 61   Ht 5' 5" (1.651 m)   Wt 50.3 kg (110 lb 14.3 oz)   SpO2 99%   BMI 18.45 kg/m²   Physical Exam  Constitutional:       Appearance: Normal appearance. She is well-developed.   HENT:      Head: Normocephalic.      Mouth/Throat:      Pharynx: No oropharyngeal exudate or posterior oropharyngeal erythema.   Eyes:      Conjunctiva/sclera: Conjunctivae normal.      Pupils: Pupils are equal, round, and reactive to light.   Neck:      Thyroid: No thyromegaly.   Cardiovascular:      Rate and Rhythm: Normal rate and regular rhythm.      Heart sounds: Normal heart sounds, S1 normal and S2 " normal. No murmur heard.    No friction rub. No gallop.   Pulmonary:      Effort: Pulmonary effort is normal.      Breath sounds: Normal breath sounds. No wheezing or rales.   Musculoskeletal:      Cervical back: Normal range of motion and neck supple.      Right lower leg: No edema.      Left lower leg: No edema.   Lymphadenopathy:      Cervical: No cervical adenopathy.   Skin:     General: Skin is warm.      Findings: No rash.   Neurological:      Mental Status: She is alert and oriented to person, place, and time.      Cranial Nerves: No cranial nerve deficit.      Gait: Gait normal.         Right breast dressings clean and intact    Assessment:       1. Open wound of right breast, subsequent encounter    2. Complicated wound infection        Plan:       Open wound of right breast, subsequent encounter    Complicated wound infection        Continue HH with wound care  F/u with surgery as planned  F/u with ID as planned  Counseled on regular exercise, maintenance of a healthy weight, balanced diet rich in fruits/vegetables and lean protein, and avoidance of unhealthy habits like smoking and excessive alcohol intake.  F/u for routine wellness visit in about 6 months

## 2022-07-05 ENCOUNTER — OFFICE VISIT (OUTPATIENT)
Dept: INFECTIOUS DISEASES | Facility: CLINIC | Age: 77
End: 2022-07-05
Payer: MEDICARE

## 2022-07-05 DIAGNOSIS — N61.0 BREAST INFECTION: Primary | ICD-10-CM

## 2022-07-05 DIAGNOSIS — Z79.2 RECEIVING INTRAVENOUS ANTIBIOTIC TREATMENT AS OUTPATIENT: ICD-10-CM

## 2022-07-05 PROCEDURE — 3288F FALL RISK ASSESSMENT DOCD: CPT | Mod: CPTII,S$GLB,, | Performed by: INTERNAL MEDICINE

## 2022-07-05 PROCEDURE — 1160F PR REVIEW ALL MEDS BY PRESCRIBER/CLIN PHARMACIST DOCUMENTED: ICD-10-PCS | Mod: CPTII,S$GLB,, | Performed by: INTERNAL MEDICINE

## 2022-07-05 PROCEDURE — 1111F PR DISCHARGE MEDS RECONCILED W/ CURRENT OUTPATIENT MED LIST: ICD-10-PCS | Mod: CPTII,S$GLB,, | Performed by: INTERNAL MEDICINE

## 2022-07-05 PROCEDURE — 99214 OFFICE O/P EST MOD 30 MIN: CPT | Mod: S$GLB,,, | Performed by: INTERNAL MEDICINE

## 2022-07-05 PROCEDURE — 99999 PR PBB SHADOW E&M-EST. PATIENT-LVL II: CPT | Mod: PBBFAC,,, | Performed by: INTERNAL MEDICINE

## 2022-07-05 PROCEDURE — 99999 PR PBB SHADOW E&M-EST. PATIENT-LVL II: ICD-10-PCS | Mod: PBBFAC,,, | Performed by: INTERNAL MEDICINE

## 2022-07-05 PROCEDURE — 1126F PR PAIN SEVERITY QUANTIFIED, NO PAIN PRESENT: ICD-10-PCS | Mod: CPTII,S$GLB,, | Performed by: INTERNAL MEDICINE

## 2022-07-05 PROCEDURE — 1101F PT FALLS ASSESS-DOCD LE1/YR: CPT | Mod: CPTII,S$GLB,, | Performed by: INTERNAL MEDICINE

## 2022-07-05 PROCEDURE — 1101F PR PT FALLS ASSESS DOC 0-1 FALLS W/OUT INJ PAST YR: ICD-10-PCS | Mod: CPTII,S$GLB,, | Performed by: INTERNAL MEDICINE

## 2022-07-05 PROCEDURE — 3288F PR FALLS RISK ASSESSMENT DOCUMENTED: ICD-10-PCS | Mod: CPTII,S$GLB,, | Performed by: INTERNAL MEDICINE

## 2022-07-05 PROCEDURE — 1160F RVW MEDS BY RX/DR IN RCRD: CPT | Mod: CPTII,S$GLB,, | Performed by: INTERNAL MEDICINE

## 2022-07-05 PROCEDURE — 1126F AMNT PAIN NOTED NONE PRSNT: CPT | Mod: CPTII,S$GLB,, | Performed by: INTERNAL MEDICINE

## 2022-07-05 PROCEDURE — 99214 PR OFFICE/OUTPT VISIT, EST, LEVL IV, 30-39 MIN: ICD-10-PCS | Mod: S$GLB,,, | Performed by: INTERNAL MEDICINE

## 2022-07-05 PROCEDURE — 87075 CULTR BACTERIA EXCEPT BLOOD: CPT | Performed by: INTERNAL MEDICINE

## 2022-07-05 PROCEDURE — 1111F DSCHRG MED/CURRENT MED MERGE: CPT | Mod: CPTII,S$GLB,, | Performed by: INTERNAL MEDICINE

## 2022-07-05 PROCEDURE — 1159F MED LIST DOCD IN RCRD: CPT | Mod: CPTII,S$GLB,, | Performed by: INTERNAL MEDICINE

## 2022-07-05 PROCEDURE — 1159F PR MEDICATION LIST DOCUMENTED IN MEDICAL RECORD: ICD-10-PCS | Mod: CPTII,S$GLB,, | Performed by: INTERNAL MEDICINE

## 2022-07-05 PROCEDURE — 87070 CULTURE OTHR SPECIMN AEROBIC: CPT | Performed by: INTERNAL MEDICINE

## 2022-07-05 NOTE — PROGRESS NOTES
Patient ID: Lilli Stringer is a 76 y.o. female.    Subjective:           Chief Complaint: Follow-up      HPI    76-year-old female with history of endometriosis status post breast augmentation in the 1980s presents to the ER due to an infection of the right breast in which the breast implant came out this morning.    - She had 2 small wound on the medial aspect of her right breast for the past 2 weeks. It was draining clear/ yellowish fluid.   - She was started on Doxy by her PCP. The wounds worsened and her breast implant spontaneously came of the bigger wound prompting admission to the hospital.   - She was taken to the OR by Dr. Mccullough yesterday for I and D.   - Blood cultures from admission grew CONS and ID was consulted.   - She was started on Vanc/Rocephin but then switched to IV Vancomycin only.   - Echo - no mention of vegetation  - the patient was discharged on IV vancomycin for 14 days.  Until 07/03/2022.  - After hospitalization vancomycin trough remain low so this was adjusted by infusion company.    Interval HPI:  - Patient tells me she is feeling well.  Afebrile.  - Wounds with purulent drainage although reports this to be better.    - Receiving IV vancomycin with no side effects      Past Medical History:   Diagnosis Date    Allergy     Endometriosis of uterus        Past Surgical History:   Procedure Laterality Date    BREAST SURGERY  1980s    DILATION AND CURETTAGE OF UTERUS      INCISION AND DRAINAGE OF BREAST Right 6/18/2022    Procedure: INCISION AND DRAINAGE, BREAST ABSCESS WITH BIOPSY;  Surgeon: Andrea Mccullough MD;  Location: [facility];  Service: General;  Laterality: Right;    TUBAL LIGATION         Review of patient's allergies indicates:   Allergen Reactions    Iodine and iodide containing products Swelling    Sulfa (sulfonamide antibiotics) Swelling    Lidocaine-elastic bandage        Family History   Problem Relation Age of Onset    Alzheimer's disease Mother      Cancer Mother     Breast cancer Neg Hx     Ovarian cancer Neg Hx        Social History     Socioeconomic History    Marital status:     Number of children: 1   Occupational History    Occupation: [employer]   Tobacco Use    Smoking status: Former Smoker     Packs/day: 0.25     Types: Cigarettes    Smokeless tobacco: Never Used   Substance and Sexual Activity    Alcohol use: Yes     Alcohol/week: 5.0 standard drinks     Types: 5 Glasses of wine per week     Comment: social / maybe 1glass w dinner    Drug use: No    Sexual activity: Yes     Partners: Male     Birth control/protection: None, Surgical       Current Outpatient Medications   Medication Instructions    ascorbic acid, vitamin C, 250 mg Chew 1 tablet, Oral, Daily    calcium-vitamin D3 (OS-KAVITHA 500 + D3) 500 mg-5 mcg (200 unit) per tablet Oral, 2 times daily with meals    fluticasone (FLONASE) 50 mcg/actuation nasal spray APPLY TWO SPRAYS NASALLY ONCE TO TWICE DAILY DEPENDING ON SEVERITY OFSYMPTOMS    Lactobacillus rhamnosus GG (CULTURELLE) 10 billion cell capsule 1 capsule, Oral, Daily    multivitamin capsule 1 capsule, Oral, Daily    vitamin E 100 Units, Oral, Daily         Review of Systems   Constitutional: Negative for activity change, appetite change, chills, diaphoresis, fatigue, fever and unexpected weight change.   HENT: Negative for sore throat.    Eyes: Negative for photophobia and visual disturbance.   Respiratory: Negative for cough and shortness of breath.    Cardiovascular: Negative for chest pain and leg swelling.   Gastrointestinal: Negative for abdominal distention and abdominal pain.   Genitourinary: Negative for difficulty urinating.   Musculoskeletal: Negative for arthralgias.   Skin: Negative for color change and rash.   Allergic/Immunologic: Negative for immunocompromised state.   Neurological: Negative for dizziness and headaches.   Psychiatric/Behavioral: The patient is not nervous/anxious.            Objective:      There were no vitals filed for this visit.    There is no height or weight on file to calculate BMI.         Physical Exam  Vitals reviewed.   Constitutional:       General: She is not in acute distress.     Appearance: She is well-developed. She is not ill-appearing.   HENT:      Head: Normocephalic and atraumatic.      Right Ear: External ear normal.      Left Ear: External ear normal.      Nose: Nose normal.   Eyes:      General: No scleral icterus.        Right eye: No discharge.         Left eye: No discharge.      Pupils: Pupils are equal, round, and reactive to light.   Cardiovascular:      Rate and Rhythm: Normal rate and regular rhythm.      Heart sounds: Normal heart sounds. No murmur heard.  Pulmonary:      Effort: Pulmonary effort is normal. No tachypnea, accessory muscle usage or respiratory distress.      Breath sounds: Normal breath sounds. No wheezing.   Abdominal:      General: There is no distension.      Palpations: Abdomen is soft.      Tenderness: There is no abdominal tenderness.   Musculoskeletal:         General: No deformity. Normal range of motion.      Cervical back: Normal range of motion and neck supple. No rigidity or tenderness.   Skin:     General: Skin is warm and dry.      Coloration: Skin is not jaundiced.   Neurological:      Mental Status: She is alert and oriented to person, place, and time.   Psychiatric:         Mood and Affect: Mood normal.         Behavior: Behavior normal.         Judgment: Judgment normal.           Assessment:           Lilli was seen today for follow-up.    Diagnoses and all orders for this visit:    Breast infection  -     Aerobic culture  -     Culture, Anaerobic    Receiving intravenous antibiotic treatment as outpatient         Plan:     - new patient to me  - The patient has been subtherapeutic with vancomycin most of the time up until 5 days ago.  Since patient still complaining of drainage from the wounds I am going to extend vancomycin  for another 10 days.  - Considering greenish purulence we will performed a deep swab for cultures.  Will adjust medication as needed.  - Follow with ID next week.       Greater than 30 minutes was spent on this encounter, which included: review of recent encounters, review and interpretation of labs/images, obtaining pertinent history, performing a physical examination, counseling and educating the patient/family/caregiver, ordering medications/tests, documenting in the electronic health record, and coordinating care with necessary providers.    Teodoro Bravo MD  Infectious Diseases

## 2022-07-08 LAB — BACTERIA SPEC AEROBE CULT: NO GROWTH

## 2022-07-12 ENCOUNTER — OFFICE VISIT (OUTPATIENT)
Dept: INFECTIOUS DISEASES | Facility: CLINIC | Age: 77
End: 2022-07-12
Payer: MEDICARE

## 2022-07-12 VITALS — WEIGHT: 110.88 LBS | BODY MASS INDEX: 18.47 KG/M2 | HEIGHT: 65 IN

## 2022-07-12 DIAGNOSIS — Z79.2 RECEIVING INTRAVENOUS ANTIBIOTIC TREATMENT AS OUTPATIENT: ICD-10-CM

## 2022-07-12 DIAGNOSIS — N61.0 BREAST INFECTION: Primary | ICD-10-CM

## 2022-07-12 LAB — BACTERIA SPEC ANAEROBE CULT: NORMAL

## 2022-07-12 PROCEDURE — 3288F PR FALLS RISK ASSESSMENT DOCUMENTED: ICD-10-PCS | Mod: CPTII,S$GLB,, | Performed by: INTERNAL MEDICINE

## 2022-07-12 PROCEDURE — 99999 PR PBB SHADOW E&M-EST. PATIENT-LVL II: CPT | Mod: PBBFAC,,, | Performed by: INTERNAL MEDICINE

## 2022-07-12 PROCEDURE — 99999 PR PBB SHADOW E&M-EST. PATIENT-LVL II: ICD-10-PCS | Mod: PBBFAC,,, | Performed by: INTERNAL MEDICINE

## 2022-07-12 PROCEDURE — 1160F RVW MEDS BY RX/DR IN RCRD: CPT | Mod: CPTII,S$GLB,, | Performed by: INTERNAL MEDICINE

## 2022-07-12 PROCEDURE — 1101F PR PT FALLS ASSESS DOC 0-1 FALLS W/OUT INJ PAST YR: ICD-10-PCS | Mod: CPTII,S$GLB,, | Performed by: INTERNAL MEDICINE

## 2022-07-12 PROCEDURE — 1160F PR REVIEW ALL MEDS BY PRESCRIBER/CLIN PHARMACIST DOCUMENTED: ICD-10-PCS | Mod: CPTII,S$GLB,, | Performed by: INTERNAL MEDICINE

## 2022-07-12 PROCEDURE — 1101F PT FALLS ASSESS-DOCD LE1/YR: CPT | Mod: CPTII,S$GLB,, | Performed by: INTERNAL MEDICINE

## 2022-07-12 PROCEDURE — 1111F PR DISCHARGE MEDS RECONCILED W/ CURRENT OUTPATIENT MED LIST: ICD-10-PCS | Mod: CPTII,S$GLB,, | Performed by: INTERNAL MEDICINE

## 2022-07-12 PROCEDURE — 1159F PR MEDICATION LIST DOCUMENTED IN MEDICAL RECORD: ICD-10-PCS | Mod: CPTII,S$GLB,, | Performed by: INTERNAL MEDICINE

## 2022-07-12 PROCEDURE — 1159F MED LIST DOCD IN RCRD: CPT | Mod: CPTII,S$GLB,, | Performed by: INTERNAL MEDICINE

## 2022-07-12 PROCEDURE — 1111F DSCHRG MED/CURRENT MED MERGE: CPT | Mod: CPTII,S$GLB,, | Performed by: INTERNAL MEDICINE

## 2022-07-12 PROCEDURE — 99214 PR OFFICE/OUTPT VISIT, EST, LEVL IV, 30-39 MIN: ICD-10-PCS | Mod: S$GLB,,, | Performed by: INTERNAL MEDICINE

## 2022-07-12 PROCEDURE — 99214 OFFICE O/P EST MOD 30 MIN: CPT | Mod: S$GLB,,, | Performed by: INTERNAL MEDICINE

## 2022-07-12 PROCEDURE — 3288F FALL RISK ASSESSMENT DOCD: CPT | Mod: CPTII,S$GLB,, | Performed by: INTERNAL MEDICINE

## 2022-07-12 NOTE — PROGRESS NOTES
Patient ID: Lilli Stringer is a 76 y.o. female.    Subjective:       Chief Complaint: Follow-up    HPI    76-year-old female with history of endometriosis status post breast augmentation in the 1980s presents to the ER due to an infection of the right breast in which the breast implant came out this morning.    - She had 2 small wound on the medial aspect of her right breast for the past 2 weeks. It was draining clear/ yellowish fluid.   - She was started on Doxy by her PCP. The wounds worsened and her breast implant spontaneously came of the bigger wound prompting admission to the hospital.   - She was taken to the OR by Dr. Mccullough yesterday for I and D.   - Blood cultures from admission grew CONS and ID was consulted.   - She was started on Vanc/Rocephin but then switched to IV Vancomycin only.   - Echo - no mention of vegetation  - The patient was discharged on IV vancomycin for 14 days.  Until 07/03/2022.  - After hospitalization vancomycin trough remain low so this was adjusted by infusion company.  - Seen in clinic on 7/5/22: Vancomycin extended one more week. Wound cultures obtained: negative    Interval HPI:  - Patient tells me she is feeling well.  Afebrile.  - Wounds are clean.   - Receiving wound care        Past Medical History:   Diagnosis Date    Allergy     Endometriosis of uterus        Past Surgical History:   Procedure Laterality Date    BREAST SURGERY  1980s    DILATION AND CURETTAGE OF UTERUS      INCISION AND DRAINAGE OF BREAST Right 6/18/2022    Procedure: INCISION AND DRAINAGE, BREAST ABSCESS WITH BIOPSY;  Surgeon: Andrea Mccullough MD;  Location[facility];  Service: General;  Laterality: Right;    TUBAL LIGATION         Review of patient's allergies indicates:   Allergen Reactions    Iodine and iodide containing products Swelling    Sulfa (sulfonamide antibiotics) Swelling    Lidocaine-elastic bandage        Family History   Problem Relation Age of Onset    Alzheimer's  disease Mother     Cancer Mother     Breast cancer Neg Hx     Ovarian cancer Neg Hx        Social History     Socioeconomic History    Marital status:     Number of children: 1   Occupational History    Occupation: [employer]   Tobacco Use    Smoking status: Former Smoker     Packs/day: 0.25     Types: Cigarettes    Smokeless tobacco: Never Used   Substance and Sexual Activity    Alcohol use: Yes     Alcohol/week: 5.0 standard drinks     Types: 5 Glasses of wine per week     Comment: social / maybe 1glass w dinner    Drug use: No    Sexual activity: Yes     Partners: Male     Birth control/protection: None, Surgical       Current Outpatient Medications   Medication Instructions    ascorbic acid, vitamin C, 250 mg Chew 1 tablet, Oral, Daily    calcium-vitamin D3 (OS-KAVITHA 500 + D3) 500 mg-5 mcg (200 unit) per tablet Oral, 2 times daily with meals    fluticasone (FLONASE) 50 mcg/actuation nasal spray APPLY TWO SPRAYS NASALLY ONCE TO TWICE DAILY DEPENDING ON SEVERITY OFSYMPTOMS    Lactobacillus rhamnosus GG (CULTURELLE) 10 billion cell capsule 1 capsule, Oral, Daily    multivitamin capsule 1 capsule, Oral, Daily    vitamin E 100 Units, Oral, Daily         Review of Systems   Constitutional: Negative for activity change, appetite change, chills, diaphoresis, fatigue, fever and unexpected weight change.   HENT: Negative for sore throat.    Eyes: Negative for photophobia and visual disturbance.   Respiratory: Negative for cough and shortness of breath.    Cardiovascular: Negative for chest pain and leg swelling.   Gastrointestinal: Negative for abdominal distention and abdominal pain.   Genitourinary: Negative for difficulty urinating.   Musculoskeletal: Negative for arthralgias.   Skin: Negative for color change and rash.   Allergic/Immunologic: Negative for immunocompromised state.   Neurological: Negative for dizziness and headaches.   Psychiatric/Behavioral: The patient is not nervous/anxious.             Objective:     There were no vitals filed for this visit.    Body mass index is 18.45 kg/m².         Physical Exam  Vitals reviewed.   Constitutional:       General: She is not in acute distress.     Appearance: She is well-developed. She is not ill-appearing.   HENT:      Head: Normocephalic and atraumatic.      Right Ear: External ear normal.      Left Ear: External ear normal.      Nose: Nose normal.   Eyes:      General: No scleral icterus.        Right eye: No discharge.         Left eye: No discharge.      Pupils: Pupils are equal, round, and reactive to light.   Cardiovascular:      Rate and Rhythm: Normal rate and regular rhythm.      Heart sounds: Normal heart sounds. No murmur heard.  Pulmonary:      Effort: Pulmonary effort is normal. No tachypnea, accessory muscle usage or respiratory distress.      Breath sounds: Normal breath sounds. No wheezing.   Abdominal:      General: There is no distension.      Palpations: Abdomen is soft.      Tenderness: There is no abdominal tenderness.   Musculoskeletal:         General: No deformity. Normal range of motion.      Cervical back: Normal range of motion and neck supple. No rigidity or tenderness.   Skin:     General: Skin is warm and dry.      Coloration: Skin is not jaundiced.      Comments: Two orifices noted on medial aspect of right breast.  Approximately 1 cm diameter with no evidence of purulence.  Granulation tissue noted.   Neurological:      Mental Status: She is alert and oriented to person, place, and time.   Psychiatric:         Mood and Affect: Mood normal.         Behavior: Behavior normal.         Judgment: Judgment normal.           Assessment:           Lilli was seen today for follow-up.    Diagnoses and all orders for this visit:    Breast infection    Receiving intravenous antibiotic treatment as outpatient         Plan:     - Patient received IV vancomycin for 20 days.   - Will remove PICC line  - Patient is to continue to  have wound care.   - Patient will be re-evaluated by surgery   - No need to follow with ID        Greater than 30 minutes was spent on this encounter, which included: review of recent encounters, review and interpretation of labs/images, obtaining pertinent history, performing a physical examination, counseling and educating the patient/family/caregiver, ordering medications/tests, documenting in the electronic health record, and coordinating care with necessary providers.    Teodoro Bravo MD  Infectious Diseases

## 2022-08-23 ENCOUNTER — DOCUMENT SCAN (OUTPATIENT)
Dept: HOME HEALTH SERVICES | Facility: HOSPITAL | Age: 77
End: 2022-08-23
Payer: MEDICARE

## 2022-08-31 DIAGNOSIS — Z78.0 MENOPAUSE: ICD-10-CM

## 2022-09-01 ENCOUNTER — PATIENT OUTREACH (OUTPATIENT)
Dept: ADMINISTRATIVE | Facility: HOSPITAL | Age: 77
End: 2022-09-01
Payer: MEDICARE

## 2022-09-01 ENCOUNTER — PATIENT MESSAGE (OUTPATIENT)
Dept: ADMINISTRATIVE | Facility: HOSPITAL | Age: 77
End: 2022-09-01
Payer: MEDICARE

## 2022-09-01 NOTE — PROGRESS NOTES
Osteoporosis screening (DEXA SCAN).  Order placed and ready to schedule.     Portal message sent.

## 2022-12-16 ENCOUNTER — PES CALL (OUTPATIENT)
Dept: ADMINISTRATIVE | Facility: CLINIC | Age: 77
End: 2022-12-16
Payer: MEDICARE

## 2023-01-02 ENCOUNTER — OFFICE VISIT (OUTPATIENT)
Dept: URGENT CARE | Facility: CLINIC | Age: 78
End: 2023-01-02
Payer: MEDICARE

## 2023-01-02 VITALS
HEIGHT: 65 IN | HEART RATE: 80 BPM | RESPIRATION RATE: 16 BRPM | TEMPERATURE: 99 F | WEIGHT: 110 LBS | SYSTOLIC BLOOD PRESSURE: 114 MMHG | DIASTOLIC BLOOD PRESSURE: 73 MMHG | BODY MASS INDEX: 18.33 KG/M2 | OXYGEN SATURATION: 98 %

## 2023-01-02 DIAGNOSIS — J32.9 SINUSITIS, UNSPECIFIED CHRONICITY, UNSPECIFIED LOCATION: Primary | ICD-10-CM

## 2023-01-02 DIAGNOSIS — R05.9 COUGH, UNSPECIFIED TYPE: ICD-10-CM

## 2023-01-02 LAB
CTP QC/QA: YES
SARS-COV-2 AG RESP QL IA.RAPID: NEGATIVE

## 2023-01-02 PROCEDURE — 3078F PR MOST RECENT DIASTOLIC BLOOD PRESSURE < 80 MM HG: ICD-10-PCS | Mod: CPTII,S$GLB,, | Performed by: INTERNAL MEDICINE

## 2023-01-02 PROCEDURE — 1159F PR MEDICATION LIST DOCUMENTED IN MEDICAL RECORD: ICD-10-PCS | Mod: CPTII,S$GLB,, | Performed by: INTERNAL MEDICINE

## 2023-01-02 PROCEDURE — 1159F MED LIST DOCD IN RCRD: CPT | Mod: CPTII,S$GLB,, | Performed by: INTERNAL MEDICINE

## 2023-01-02 PROCEDURE — 3074F PR MOST RECENT SYSTOLIC BLOOD PRESSURE < 130 MM HG: ICD-10-PCS | Mod: CPTII,S$GLB,, | Performed by: INTERNAL MEDICINE

## 2023-01-02 PROCEDURE — 3078F DIAST BP <80 MM HG: CPT | Mod: CPTII,S$GLB,, | Performed by: INTERNAL MEDICINE

## 2023-01-02 PROCEDURE — 99214 OFFICE O/P EST MOD 30 MIN: CPT | Mod: S$GLB,,, | Performed by: INTERNAL MEDICINE

## 2023-01-02 PROCEDURE — 87811 SARS-COV-2 COVID19 W/OPTIC: CPT | Mod: QW,S$GLB,, | Performed by: INTERNAL MEDICINE

## 2023-01-02 PROCEDURE — 87811 SARS CORONAVIRUS 2 ANTIGEN POCT, MANUAL READ: ICD-10-PCS | Mod: QW,S$GLB,, | Performed by: INTERNAL MEDICINE

## 2023-01-02 PROCEDURE — 1160F PR REVIEW ALL MEDS BY PRESCRIBER/CLIN PHARMACIST DOCUMENTED: ICD-10-PCS | Mod: CPTII,S$GLB,, | Performed by: INTERNAL MEDICINE

## 2023-01-02 PROCEDURE — 3074F SYST BP LT 130 MM HG: CPT | Mod: CPTII,S$GLB,, | Performed by: INTERNAL MEDICINE

## 2023-01-02 PROCEDURE — 1160F RVW MEDS BY RX/DR IN RCRD: CPT | Mod: CPTII,S$GLB,, | Performed by: INTERNAL MEDICINE

## 2023-01-02 PROCEDURE — 99214 PR OFFICE/OUTPT VISIT, EST, LEVL IV, 30-39 MIN: ICD-10-PCS | Mod: S$GLB,,, | Performed by: INTERNAL MEDICINE

## 2023-01-02 RX ORDER — FLUTICASONE PROPIONATE 50 MCG
1 SPRAY, SUSPENSION (ML) NASAL DAILY
Qty: 9.9 ML | Refills: 0 | Status: SHIPPED | OUTPATIENT
Start: 2023-01-02

## 2023-01-02 RX ORDER — AZITHROMYCIN 250 MG/1
TABLET, FILM COATED ORAL
Qty: 6 TABLET | Refills: 0 | Status: SHIPPED | OUTPATIENT
Start: 2023-01-02

## 2023-01-02 NOTE — PROGRESS NOTES
"Subjective:       Patient ID: Lilli Stringer is a 77 y.o. female.    Vitals:  height is 5' 5" (1.651 m) and weight is 49.9 kg (110 lb). Her temperature is 98.8 °F (37.1 °C). Her blood pressure is 114/73 and her pulse is 80. Her respiration is 16 and oxygen saturation is 98%.     Chief Complaint: Cough    Pt came in today with complaints of cough, stuffy nose and yellow color mucus that started on yesterday. She stated that she has not taken anything for her symptoms    Cough  This is a new problem. The current episode started yesterday. The problem has been gradually worsening. The problem occurs every few minutes. The cough is Non-productive. Associated symptoms include headaches, nasal congestion and postnasal drip. Pertinent negatives include no chest pain, chills, ear congestion, ear pain, fever, heartburn, hemoptysis, myalgias, rash, rhinorrhea, sore throat, shortness of breath, sweats, weight loss or wheezing. Nothing aggravates the symptoms. She has tried nothing for the symptoms. The treatment provided no relief. There is no history of asthma, bronchiectasis, bronchitis, COPD, emphysema, environmental allergies or pneumonia.   Constitution: Negative for chills and fever.   HENT:  Positive for postnasal drip. Negative for ear pain and sore throat.    Cardiovascular:  Negative for chest pain.   Respiratory:  Positive for cough. Negative for bloody sputum, shortness of breath and wheezing.    Gastrointestinal:  Negative for heartburn.   Musculoskeletal:  Negative for muscle ache.   Skin:  Negative for rash.   Allergic/Immunologic: Negative for environmental allergies.   Neurological:  Positive for headaches.     Objective:      Physical Exam   Constitutional: She is oriented to person, place, and time. She appears well-developed. She is cooperative.  Non-toxic appearance. She does not appear ill. No distress.   HENT:   Head: Normocephalic and atraumatic.   Ears:   Right Ear: Hearing, tympanic " membrane, external ear and ear canal normal.   Left Ear: Hearing, tympanic membrane, external ear and ear canal normal.   Nose: Rhinorrhea present. No mucosal edema or nasal deformity. No epistaxis. Right sinus exhibits no maxillary sinus tenderness and no frontal sinus tenderness. Left sinus exhibits no maxillary sinus tenderness and no frontal sinus tenderness.   Mouth/Throat: Uvula is midline and mucous membranes are normal. No trismus in the jaw. Normal dentition. No uvula swelling. Posterior oropharyngeal erythema present. No oropharyngeal exudate or posterior oropharyngeal edema.   Eyes: Conjunctivae and lids are normal. No scleral icterus.   Neck: Trachea normal and phonation normal. Neck supple. No edema present. No erythema present. No neck rigidity present.   Cardiovascular: Normal rate, regular rhythm, normal heart sounds and normal pulses.   Pulmonary/Chest: Effort normal and breath sounds normal. No respiratory distress. She has no decreased breath sounds. She has no rhonchi.   Abdominal: Normal appearance.   Musculoskeletal: Normal range of motion.         General: No deformity. Normal range of motion.   Neurological: She is alert and oriented to person, place, and time. She exhibits normal muscle tone. Coordination normal.   Skin: Skin is warm, dry, intact, not diaphoretic and not pale.   Psychiatric: Her speech is normal and behavior is normal. Judgment and thought content normal.   Nursing note and vitals reviewed.      Assessment:       1. Sinusitis, unspecified chronicity, unspecified location    2. Cough, unspecified type          Plan:         Sinusitis, unspecified chronicity, unspecified location  -     azithromycin (Z-NAYELI) 250 MG tablet; Take 2 tablets by mouth on day 1; Take 1 tablet by mouth on days 2-5  Dispense: 6 tablet; Refill: 0  -     fluticasone propionate (FLONASE) 50 mcg/actuation nasal spray; 1 spray (50 mcg total) by Each Nostril route once daily.  Dispense: 9.9 mL; Refill:  0    Cough, unspecified type  -     SARS Coronavirus 2 Antigen, POCT Manual Read         Patient Instructions   If your condition worsens we recommend that you receive another evaluation at the emergency room immediately or contact your primary medical clinics after hours call service to discuss your concerns. You must understand that you've received an Urgent Care treatment only and that you may be released before all of your medical problems are known or treated. You, the patient, will arrange for follow up care as instructed.  Drink plenty of Fluids  Wash hands frequently using mild antibacterial soap lathering for at least 15 seconds then rinse  Get plenty of Rest  Salt water gargles  Follow up in 1-2 weeks with Primary Care physician if not significantly better.   If you are not allergic please Tylenol every 4-6 hours as needed and/or Ibuprofen every 6-8 hours as needed, over the counter for pain or fever.  Take OTC Cough/Congestion medicine as needed. Talk to your pharmacist about the best option for you.     My notes were dictated with M*Modal Fluency Software. Any misspellings or nonsensical grammar should be attributed to its use and allowances made for errors and typographic syntactical error(s).

## 2023-04-02 ENCOUNTER — OFFICE VISIT (OUTPATIENT)
Dept: URGENT CARE | Facility: CLINIC | Age: 78
End: 2023-04-02
Payer: MEDICARE

## 2023-04-02 VITALS
SYSTOLIC BLOOD PRESSURE: 136 MMHG | HEIGHT: 65 IN | DIASTOLIC BLOOD PRESSURE: 74 MMHG | WEIGHT: 115 LBS | BODY MASS INDEX: 19.16 KG/M2 | TEMPERATURE: 98 F | HEART RATE: 89 BPM

## 2023-04-02 DIAGNOSIS — L50.9 URTICARIA: Primary | ICD-10-CM

## 2023-04-02 PROCEDURE — 99213 PR OFFICE/OUTPT VISIT, EST, LEVL III, 20-29 MIN: ICD-10-PCS | Mod: S$GLB,,, | Performed by: EMERGENCY MEDICINE

## 2023-04-02 PROCEDURE — 99213 OFFICE O/P EST LOW 20 MIN: CPT | Mod: S$GLB,,, | Performed by: EMERGENCY MEDICINE

## 2023-04-02 RX ORDER — PREDNISONE 20 MG/1
TABLET ORAL
Qty: 5 TABLET | Refills: 0 | Status: SHIPPED | OUTPATIENT
Start: 2023-04-02 | End: 2023-04-06

## 2023-04-02 NOTE — PROGRESS NOTES
"Subjective:      Patient ID: Lilli Stringer is a 77 y.o. female.    Vitals:  height is 5' 5" (1.651 m) and weight is 52.2 kg (115 lb). Her oral temperature is 98 °F (36.7 °C). Her blood pressure is 136/74 and her pulse is 89.     Chief Complaint: Rash    This is a 77 y.o. female who presents today with a chief complaint of  rash on back, bilateral legs, abdominal. X 1 day. Pt has been using benadryl cream     Rash  This is a new problem. The current episode started yesterday. The problem has been gradually worsening since onset. The affected locations include the back, abdomen, left arm, left upper leg, left lower leg, right arm and right lower leg. The rash is characterized by dryness, itchiness and redness. It is unknown if there was an exposure to a precipitant. Pertinent negatives include no anorexia, congestion, cough, diarrhea, eye pain, facial edema, fatigue, fever, joint pain, nail changes, rhinorrhea, shortness of breath, sore throat or vomiting. Past treatments include anti-itch cream. The treatment provided no relief. There is no history of allergies, asthma, eczema or varicella.     Constitution: Negative for fatigue and fever.   HENT:  Negative for congestion and sore throat.    Eyes:  Negative for eye pain.   Respiratory:  Negative for cough and shortness of breath.    Gastrointestinal:  Negative for vomiting and diarrhea.   Skin:  Positive for rash. Negative for erythema.    Objective:     Physical Exam   Constitutional: She is oriented to person, place, and time. She appears well-developed.   HENT:   Head: Normocephalic and atraumatic. Head is without abrasion, without contusion and without laceration.   Ears:   Right Ear: External ear normal.   Left Ear: External ear normal.   Nose: Nose normal.   Mouth/Throat: Mucous membranes are normal. Mucous membranes are moist. No oropharyngeal exudate or posterior oropharyngeal erythema. Oropharynx is clear.      Comments: Airway is clear.  No " tongue or lip swelling.  No drooling.  Eyes: Conjunctivae, EOM and lids are normal.   Neck: Trachea normal and phonation normal. Neck supple.   Cardiovascular: Normal rate, regular rhythm and normal heart sounds.   Pulmonary/Chest: Effort normal and breath sounds normal. No stridor. No respiratory distress. She has no wheezes.         Comments: Good air movement.  No wheezing.  No respiratory distress.    Musculoskeletal: Normal range of motion.         General: Normal range of motion.   Neurological: She is alert and oriented to person, place, and time.   Skin: Skin is warm, dry, intact and rash. Capillary refill takes less than 2 seconds. No abrasion, No burn, No bruising, No erythema and No ecchymosis         Comments: Faint generalized, scattered urticaria   Psychiatric: Her speech is normal and behavior is normal. Mood, judgment and thought content normal.   Nursing note and vitals reviewed.    77-year-old says she ate vegetable pizza yesterday and was standing [place] and then developed generalized hives with itching.  She use topical cream with minimal improvement.  She says she itched all night.  She did not take any oral medications.  She has minimal evidence of urticaria this morning but still has itching.  Will recommend Claritin and give short course of prednisone    Assessment:     1. Urticaria        Plan:       Urticaria

## 2023-04-12 ENCOUNTER — PES CALL (OUTPATIENT)
Dept: ADMINISTRATIVE | Facility: CLINIC | Age: 78
End: 2023-04-12
Payer: MEDICARE

## 2023-09-20 DIAGNOSIS — Z78.0 ASYMPTOMATIC MENOPAUSAL STATE: ICD-10-CM

## 2023-09-28 ENCOUNTER — PATIENT OUTREACH (OUTPATIENT)
Dept: ADMINISTRATIVE | Facility: HOSPITAL | Age: 78
End: 2023-09-28
Payer: MEDICARE

## 2023-09-28 ENCOUNTER — PATIENT MESSAGE (OUTPATIENT)
Dept: ADMINISTRATIVE | Facility: HOSPITAL | Age: 78
End: 2023-09-28
Payer: MEDICARE

## 2023-09-28 NOTE — PROGRESS NOTES
Osteoporosis screening (DEXA SCAN)     Non-compliant report chart audits for Osteoporosis screening (DEXA SCAN)     Outreach to patient in reference to SCHEDULING A Dexa Scan    Order placed

## 2024-01-11 DIAGNOSIS — Z00.00 ENCOUNTER FOR MEDICARE ANNUAL WELLNESS EXAM: ICD-10-CM

## 2024-07-09 ENCOUNTER — PATIENT MESSAGE (OUTPATIENT)
Dept: ADMINISTRATIVE | Facility: HOSPITAL | Age: 79
End: 2024-07-09
Payer: MEDICARE

## 2025-02-13 ENCOUNTER — TELEPHONE (OUTPATIENT)
Dept: ADMINISTRATIVE | Facility: HOSPITAL | Age: 80
End: 2025-02-13
Payer: MEDICARE

## 2025-02-13 NOTE — TELEPHONE ENCOUNTER
Spoke with patient regarding scheduling annual with Dr Gonzáles as she hasn't been seen since 2022.  She stated that her  has an appt on March 3rd and she would like to know if she could possibly be seen that day due to not having transportation.

## 2025-02-13 NOTE — TELEPHONE ENCOUNTER
I can see her at 9am, but I would want to see her at that time. I would not be able to see both in the 10:20 time slot.

## 2025-02-13 NOTE — TELEPHONE ENCOUNTER
I spoke with Ms. Portia and offered her the 9 am the same day.  Told her that they can not be seen together at 10:20.  She stated that she will call to schedule.

## 2025-03-24 DIAGNOSIS — Z00.00 ENCOUNTER FOR MEDICARE ANNUAL WELLNESS EXAM: ICD-10-CM
